# Patient Record
Sex: MALE | Race: ASIAN | NOT HISPANIC OR LATINO
[De-identification: names, ages, dates, MRNs, and addresses within clinical notes are randomized per-mention and may not be internally consistent; named-entity substitution may affect disease eponyms.]

---

## 2019-09-09 ENCOUNTER — APPOINTMENT (OUTPATIENT)
Dept: PEDIATRIC ALLERGY IMMUNOLOGY | Facility: CLINIC | Age: 39
End: 2019-09-09

## 2020-07-01 ENCOUNTER — APPOINTMENT (OUTPATIENT)
Dept: ENDOCRINOLOGY | Facility: CLINIC | Age: 40
End: 2020-07-01
Payer: COMMERCIAL

## 2020-07-01 VITALS
OXYGEN SATURATION: 97 % | BODY MASS INDEX: 21.22 KG/M2 | SYSTOLIC BLOOD PRESSURE: 109 MMHG | RESPIRATION RATE: 15 BRPM | HEIGHT: 68 IN | HEART RATE: 70 BPM | WEIGHT: 140 LBS | TEMPERATURE: 98.1 F | DIASTOLIC BLOOD PRESSURE: 74 MMHG

## 2020-07-01 DIAGNOSIS — Z83.3 FAMILY HISTORY OF DIABETES MELLITUS: ICD-10-CM

## 2020-07-01 DIAGNOSIS — F32.9 MAJOR DEPRESSIVE DISORDER, SINGLE EPISODE, UNSPECIFIED: ICD-10-CM

## 2020-07-01 DIAGNOSIS — Z80.0 FAMILY HISTORY OF MALIGNANT NEOPLASM OF DIGESTIVE ORGANS: ICD-10-CM

## 2020-07-01 DIAGNOSIS — Z78.9 OTHER SPECIFIED HEALTH STATUS: ICD-10-CM

## 2020-07-01 DIAGNOSIS — R53.83 OTHER FATIGUE: ICD-10-CM

## 2020-07-01 DIAGNOSIS — Z86.59 PERSONAL HISTORY OF OTHER MENTAL AND BEHAVIORAL DISORDERS: ICD-10-CM

## 2020-07-01 PROCEDURE — 99203 OFFICE O/P NEW LOW 30 MIN: CPT | Mod: 25

## 2020-07-01 PROCEDURE — 36415 COLL VENOUS BLD VENIPUNCTURE: CPT

## 2020-07-01 RX ORDER — ESCITALOPRAM OXALATE 20 MG/1
20 TABLET ORAL
Refills: 0 | Status: ACTIVE | COMMUNITY
Start: 2020-07-01

## 2020-07-01 RX ORDER — LEVOTHYROXINE SODIUM 0.03 MG/1
25 TABLET ORAL
Refills: 0 | Status: ACTIVE | COMMUNITY
Start: 2020-07-01

## 2020-07-01 NOTE — PHYSICAL EXAM
[Alert] : alert [Well Nourished] : well nourished [Healthy Appearance] : healthy appearance [No Acute Distress] : no acute distress [Normal Sclera/Conjunctiva] : normal sclera/conjunctiva [Normal Voice/Communication] : normal voice communication [Well Developed] : well developed [No Neck Mass] : no neck mass was observed [No Proptosis] : no proptosis [No LAD] : no lymphadenopathy [No Thyroid Nodules] : no palpable thyroid nodules [Thyroid Not Enlarged] : the thyroid was not enlarged [Supple] : the neck was supple [No Accessory Muscle Use] : no accessory muscle use [No Respiratory Distress] : no respiratory distress [Normal Rate and Effort] : normal respiratory rate and effort [Normal Rate] : heart rate was normal [Spine Straight] : spine straight [No Edema] : no peripheral edema [No Stigmata of Cushings Syndrome] : no stigmata of Cushings Syndrome [No Clubbing, Cyanosis] : no clubbing  or cyanosis of the fingernails [Normal Gait] : normal gait [No Involuntary Movements] : no involuntary movements were seen [Acanthosis Nigricans] : no acanthosis nigricans [No Tremors] : no tremors [Normal Affect] : the affect was normal [Normal Insight/Judgement] : insight and judgment were intact [Normal Mood] : the mood was normal

## 2020-07-01 NOTE — ASSESSMENT
[FreeTextEntry1] : This is a 40-year-old male with primary hypothyroidism, here for evaluation of fatigue.\par Checked TFTs as he decreased levothyroxine on his own to 25 mcg two weeks ago.\par Check morning fasting testosterone.\par Check cortisol.\par Check thyroid sonogram to evaluate for thyroid nodules.\par Further management pending above results.

## 2020-07-01 NOTE — HISTORY OF PRESENT ILLNESS
[FreeTextEntry1] : CC: Exhausted\par This is a 40-year-old male with primary hypothyroidism, here for evaluation of extreme fatigue.\par He was diagnosed with hypothyroidism at the age of 35. He reports extreme fatigue, exhaustion, and decreased motivation.\par He currently takes levothyroxine 25 mcg daily. He decreased this on his own 2 weeks ago from 50 mcg daily as he did not believe 50 mcg was helping his symptoms.\par He was started on Lexapro approximately 3 years ago.

## 2020-07-02 LAB
CORTIS SERPL-MCNC: 11.1 UG/DL
T4 FREE SERPL-MCNC: 1.1 NG/DL
THYROGLOB AB SERPL-ACNC: <20 IU/ML
THYROPEROXIDASE AB SERPL IA-ACNC: 20.4 IU/ML
TSH SERPL-ACNC: 3.54 UIU/ML

## 2020-07-09 LAB
TESTOST BND SERPL-MCNC: 8.2 PG/ML
TESTOST SERPL-MCNC: 423.9 NG/DL

## 2020-07-10 ENCOUNTER — TRANSCRIPTION ENCOUNTER (OUTPATIENT)
Age: 40
End: 2020-07-10

## 2020-10-29 ENCOUNTER — APPOINTMENT (OUTPATIENT)
Dept: ENDOCRINOLOGY | Facility: CLINIC | Age: 40
End: 2020-10-29

## 2021-01-06 ENCOUNTER — APPOINTMENT (OUTPATIENT)
Dept: ENDOCRINOLOGY | Facility: CLINIC | Age: 41
End: 2021-01-06
Payer: COMMERCIAL

## 2021-01-06 VITALS
OXYGEN SATURATION: 98 % | HEART RATE: 66 BPM | WEIGHT: 142 LBS | TEMPERATURE: 98 F | DIASTOLIC BLOOD PRESSURE: 73 MMHG | HEIGHT: 69.29 IN | BODY MASS INDEX: 20.79 KG/M2 | SYSTOLIC BLOOD PRESSURE: 111 MMHG

## 2021-01-06 DIAGNOSIS — E04.1 NONTOXIC SINGLE THYROID NODULE: ICD-10-CM

## 2021-01-06 DIAGNOSIS — E03.9 HYPOTHYROIDISM, UNSPECIFIED: ICD-10-CM

## 2021-01-06 PROCEDURE — 36415 COLL VENOUS BLD VENIPUNCTURE: CPT

## 2021-01-06 PROCEDURE — 99072 ADDL SUPL MATRL&STAF TM PHE: CPT

## 2021-01-06 PROCEDURE — 99214 OFFICE O/P EST MOD 30 MIN: CPT | Mod: 25

## 2021-01-06 NOTE — ASSESSMENT
[FreeTextEntry1] : This is a 40-year-old male with primary hypothyroidism, thyroid nodule, here for follow-up.\par Thyroid antibodies are negative.\par Check TFTs.  Continue levothyroxine 25 mcg daily pending results.\par Testosterone and cortisol are normal.  Advised to follow-up with PCP regarding fatigue.\par Check thyroid sonogram in July 2021 to follow stability of thyroid nodule.

## 2021-01-06 NOTE — HISTORY OF PRESENT ILLNESS
[FreeTextEntry1] : CC: Thyroid check\par This is a 40-year-old male with primary hypothyroidism, thyroid nodule, here for follow-up.\par He was diagnosed with hypothyroidism at the age of 35.  Thyroid antibodies are negative.  He continues to report extreme fatigue and difficulty keeping up with his 2 sons.  He naps in the middle of the day.  He also reports headache and itchy skin.\par He currently takes levothyroxine 25 mcg daily. \par Lexapro has been decreased to 5 mg daily.

## 2021-01-06 NOTE — REVIEW OF SYSTEMS
[Fatigue] : fatigue [Headaches] : headaches [All other systems negative] : All other systems negative [de-identified] : Itchy skin

## 2021-01-08 ENCOUNTER — NON-APPOINTMENT (OUTPATIENT)
Age: 41
End: 2021-01-08

## 2021-01-08 DIAGNOSIS — E55.9 VITAMIN D DEFICIENCY, UNSPECIFIED: ICD-10-CM

## 2021-01-08 LAB
25(OH)D3 SERPL-MCNC: 28.8 NG/ML
ALBUMIN SERPL ELPH-MCNC: 5.2 G/DL
ALP BLD-CCNC: 61 U/L
ALT SERPL-CCNC: 14 U/L
ANION GAP SERPL CALC-SCNC: 12 MMOL/L
AST SERPL-CCNC: 23 U/L
BASOPHILS # BLD AUTO: 0.07 K/UL
BASOPHILS NFR BLD AUTO: 1 %
BILIRUB SERPL-MCNC: 0.2 MG/DL
BUN SERPL-MCNC: 27 MG/DL
CALCIUM SERPL-MCNC: 10 MG/DL
CHLORIDE SERPL-SCNC: 103 MMOL/L
CO2 SERPL-SCNC: 24 MMOL/L
CREAT SERPL-MCNC: 0.91 MG/DL
EOSINOPHIL # BLD AUTO: 0.45 K/UL
EOSINOPHIL NFR BLD AUTO: 6.5 %
GLUCOSE SERPL-MCNC: 99 MG/DL
HCT VFR BLD CALC: 41.5 %
HGB BLD-MCNC: 13.8 G/DL
IMM GRANULOCYTES NFR BLD AUTO: 0.1 %
LYMPHOCYTES # BLD AUTO: 1.81 K/UL
LYMPHOCYTES NFR BLD AUTO: 26.1 %
MAN DIFF?: NORMAL
MCHC RBC-ENTMCNC: 29.4 PG
MCHC RBC-ENTMCNC: 33.3 GM/DL
MCV RBC AUTO: 88.3 FL
MONOCYTES # BLD AUTO: 0.37 K/UL
MONOCYTES NFR BLD AUTO: 5.3 %
NEUTROPHILS # BLD AUTO: 4.22 K/UL
NEUTROPHILS NFR BLD AUTO: 61 %
PLATELET # BLD AUTO: 227 K/UL
POTASSIUM SERPL-SCNC: 4.5 MMOL/L
PROT SERPL-MCNC: 7.3 G/DL
RBC # BLD: 4.7 M/UL
RBC # FLD: 12.4 %
SODIUM SERPL-SCNC: 139 MMOL/L
T4 FREE SERPL-MCNC: 1.3 NG/DL
TSH SERPL-ACNC: 2.97 UIU/ML
WBC # FLD AUTO: 6.93 K/UL

## 2022-07-22 ENCOUNTER — EMERGENCY (EMERGENCY)
Facility: HOSPITAL | Age: 42
LOS: 1 days | Discharge: HOME | End: 2022-07-22
Attending: EMERGENCY MEDICINE
Payer: COMMERCIAL

## 2022-07-22 VITALS
SYSTOLIC BLOOD PRESSURE: 130 MMHG | DIASTOLIC BLOOD PRESSURE: 72 MMHG | HEART RATE: 67 BPM | OXYGEN SATURATION: 100 % | RESPIRATION RATE: 20 BRPM | TEMPERATURE: 98 F

## 2022-07-22 VITALS
RESPIRATION RATE: 20 BRPM | HEART RATE: 70 BPM | DIASTOLIC BLOOD PRESSURE: 90 MMHG | OXYGEN SATURATION: 100 % | SYSTOLIC BLOOD PRESSURE: 134 MMHG | HEIGHT: 68 IN | WEIGHT: 145.06 LBS | TEMPERATURE: 98 F

## 2022-07-22 DIAGNOSIS — F43.23 ADJUSTMENT DISORDER WITH MIXED ANXIETY AND DEPRESSED MOOD: ICD-10-CM

## 2022-07-22 LAB
ALBUMIN SERPL ELPH-MCNC: 5.1 G/DL — HIGH (ref 3.3–5)
ALP SERPL-CCNC: 76 U/L — SIGNIFICANT CHANGE UP (ref 40–120)
ALT FLD-CCNC: 21 U/L — SIGNIFICANT CHANGE UP (ref 10–45)
AMPHET UR-MCNC: NEGATIVE — SIGNIFICANT CHANGE UP
ANION GAP SERPL CALC-SCNC: 14 MMOL/L — SIGNIFICANT CHANGE UP (ref 5–17)
APAP SERPL-MCNC: <15 UG/ML — SIGNIFICANT CHANGE UP (ref 10–30)
APPEARANCE UR: CLEAR — SIGNIFICANT CHANGE UP
AST SERPL-CCNC: 28 U/L — SIGNIFICANT CHANGE UP (ref 10–40)
BARBITURATES UR SCN-MCNC: NEGATIVE — SIGNIFICANT CHANGE UP
BASOPHILS # BLD AUTO: 0.02 K/UL — SIGNIFICANT CHANGE UP (ref 0–0.2)
BASOPHILS NFR BLD AUTO: 0.2 % — SIGNIFICANT CHANGE UP (ref 0–2)
BENZODIAZ UR-MCNC: NEGATIVE — SIGNIFICANT CHANGE UP
BILIRUB SERPL-MCNC: 0.6 MG/DL — SIGNIFICANT CHANGE UP (ref 0.2–1.2)
BILIRUB UR-MCNC: NEGATIVE — SIGNIFICANT CHANGE UP
BUN SERPL-MCNC: 14 MG/DL — SIGNIFICANT CHANGE UP (ref 7–23)
CALCIUM SERPL-MCNC: 9.7 MG/DL — SIGNIFICANT CHANGE UP (ref 8.4–10.5)
CHLORIDE SERPL-SCNC: 99 MMOL/L — SIGNIFICANT CHANGE UP (ref 96–108)
CO2 SERPL-SCNC: 24 MMOL/L — SIGNIFICANT CHANGE UP (ref 22–31)
COCAINE METAB.OTHER UR-MCNC: NEGATIVE — SIGNIFICANT CHANGE UP
COLOR SPEC: SIGNIFICANT CHANGE UP
CREAT SERPL-MCNC: 0.96 MG/DL — SIGNIFICANT CHANGE UP (ref 0.5–1.3)
DIFF PNL FLD: NEGATIVE — SIGNIFICANT CHANGE UP
EGFR: 101 ML/MIN/1.73M2 — SIGNIFICANT CHANGE UP
EOSINOPHIL # BLD AUTO: 0.07 K/UL — SIGNIFICANT CHANGE UP (ref 0–0.5)
EOSINOPHIL NFR BLD AUTO: 0.7 % — SIGNIFICANT CHANGE UP (ref 0–6)
ETHANOL SERPL-MCNC: <10 MG/DL — SIGNIFICANT CHANGE UP (ref 0–10)
GLUCOSE SERPL-MCNC: 117 MG/DL — HIGH (ref 70–99)
GLUCOSE UR QL: NEGATIVE — SIGNIFICANT CHANGE UP
HCT VFR BLD CALC: 40.3 % — SIGNIFICANT CHANGE UP (ref 39–50)
HGB BLD-MCNC: 13.6 G/DL — SIGNIFICANT CHANGE UP (ref 13–17)
IMM GRANULOCYTES NFR BLD AUTO: 0.2 % — SIGNIFICANT CHANGE UP (ref 0–1.5)
KETONES UR-MCNC: SIGNIFICANT CHANGE UP
LEUKOCYTE ESTERASE UR-ACNC: NEGATIVE — SIGNIFICANT CHANGE UP
LYMPHOCYTES # BLD AUTO: 1.67 K/UL — SIGNIFICANT CHANGE UP (ref 1–3.3)
LYMPHOCYTES # BLD AUTO: 17.5 % — SIGNIFICANT CHANGE UP (ref 13–44)
MCHC RBC-ENTMCNC: 28.7 PG — SIGNIFICANT CHANGE UP (ref 27–34)
MCHC RBC-ENTMCNC: 33.7 GM/DL — SIGNIFICANT CHANGE UP (ref 32–36)
MCV RBC AUTO: 85 FL — SIGNIFICANT CHANGE UP (ref 80–100)
METHADONE UR-MCNC: NEGATIVE — SIGNIFICANT CHANGE UP
MONOCYTES # BLD AUTO: 0.34 K/UL — SIGNIFICANT CHANGE UP (ref 0–0.9)
MONOCYTES NFR BLD AUTO: 3.6 % — SIGNIFICANT CHANGE UP (ref 2–14)
NEUTROPHILS # BLD AUTO: 7.41 K/UL — HIGH (ref 1.8–7.4)
NEUTROPHILS NFR BLD AUTO: 77.8 % — HIGH (ref 43–77)
NITRITE UR-MCNC: NEGATIVE — SIGNIFICANT CHANGE UP
NRBC # BLD: 0 /100 WBCS — SIGNIFICANT CHANGE UP (ref 0–0)
OPIATES UR-MCNC: NEGATIVE — SIGNIFICANT CHANGE UP
OXYCODONE UR-MCNC: NEGATIVE — SIGNIFICANT CHANGE UP
PCP SPEC-MCNC: SIGNIFICANT CHANGE UP
PCP UR-MCNC: NEGATIVE — SIGNIFICANT CHANGE UP
PH UR: 5.5 — SIGNIFICANT CHANGE UP (ref 5–8)
PLATELET # BLD AUTO: 204 K/UL — SIGNIFICANT CHANGE UP (ref 150–400)
POTASSIUM SERPL-MCNC: 4 MMOL/L — SIGNIFICANT CHANGE UP (ref 3.5–5.3)
POTASSIUM SERPL-SCNC: 4 MMOL/L — SIGNIFICANT CHANGE UP (ref 3.5–5.3)
PROT SERPL-MCNC: 8.4 G/DL — HIGH (ref 6–8.3)
PROT UR-MCNC: NEGATIVE — SIGNIFICANT CHANGE UP
RBC # BLD: 4.74 M/UL — SIGNIFICANT CHANGE UP (ref 4.2–5.8)
RBC # FLD: 12.2 % — SIGNIFICANT CHANGE UP (ref 10.3–14.5)
SALICYLATES SERPL-MCNC: <2 MG/DL — LOW (ref 15–30)
SARS-COV-2 RNA SPEC QL NAA+PROBE: SIGNIFICANT CHANGE UP
SODIUM SERPL-SCNC: 137 MMOL/L — SIGNIFICANT CHANGE UP (ref 135–145)
SP GR SPEC: 1.01 — SIGNIFICANT CHANGE UP (ref 1.01–1.02)
THC UR QL: NEGATIVE — SIGNIFICANT CHANGE UP
TSH SERPL-MCNC: 3.24 UIU/ML — SIGNIFICANT CHANGE UP (ref 0.27–4.2)
UROBILINOGEN FLD QL: NEGATIVE — SIGNIFICANT CHANGE UP
WBC # BLD: 9.53 K/UL — SIGNIFICANT CHANGE UP (ref 3.8–10.5)
WBC # FLD AUTO: 9.53 K/UL — SIGNIFICANT CHANGE UP (ref 3.8–10.5)

## 2022-07-22 PROCEDURE — 81003 URINALYSIS AUTO W/O SCOPE: CPT

## 2022-07-22 PROCEDURE — U0003: CPT

## 2022-07-22 PROCEDURE — U0005: CPT

## 2022-07-22 PROCEDURE — 80307 DRUG TEST PRSMV CHEM ANLYZR: CPT

## 2022-07-22 PROCEDURE — 90792 PSYCH DIAG EVAL W/MED SRVCS: CPT | Mod: GC

## 2022-07-22 PROCEDURE — 99285 EMERGENCY DEPT VISIT HI MDM: CPT

## 2022-07-22 PROCEDURE — 85025 COMPLETE CBC W/AUTO DIFF WBC: CPT

## 2022-07-22 PROCEDURE — 36415 COLL VENOUS BLD VENIPUNCTURE: CPT

## 2022-07-22 PROCEDURE — 84443 ASSAY THYROID STIM HORMONE: CPT

## 2022-07-22 PROCEDURE — 80053 COMPREHEN METABOLIC PANEL: CPT

## 2022-07-22 RX ORDER — ESCITALOPRAM OXALATE 10 MG/1
1 TABLET, FILM COATED ORAL
Qty: 7 | Refills: 0
Start: 2022-07-22 | End: 2022-07-28

## 2022-07-22 RX ADMIN — Medication 0.5 MILLIGRAM(S): at 14:38

## 2022-07-22 NOTE — ED BEHAVIORAL HEALTH ASSESSMENT NOTE - OTHER PAST PSYCHIATRIC HISTORY (INCLUDE DETAILS REGARDING ONSET, COURSE OF ILLNESS, INPATIENT/OUTPATIENT TREATMENT)
see HPI: hx treatment of depression with Lexapro per neurologist, good effect, self-discontinued one year ago. Trial of one other unknown antidepressant. No psychiatric hospitalizations, suicide attempts, self-injurious behavior, treatment per outpatient psychiatry.

## 2022-07-22 NOTE — ED ADULT NURSE NOTE - DATE OF LAST VACCINATION
Do not drink alcohol, drive or operate heavy machinery while taking Robaxin.  Do not take OTC NSAIDs while you are on Toradol.  Do not take Toradol within 6 hr of discharge from the ER.  Take all medications as prescribed.  Follow-up with your primary care provider as discussed.  Please remember that you had a visit to the emergency room today and this does not substitute as primary care services for ongoing management because emergency services is a snap shot in time.  Should you have any worsening condition that requires emergency services do not hesitate to return to the ER.    COVID-19 TESTING  IF YOU DESIRE TO BE TESTED, CALL TO SCHEDULE AN APPOINTMENT:  Hot Line [phone]  [address] [place] [address], [place] [address]  [facility]  Hours 8am-5pm Monday Through Friday      
22-Oct-2021

## 2022-07-22 NOTE — ED BEHAVIORAL HEALTH ASSESSMENT NOTE - NSBHATTESTCOMMENTATTENDFT_PSY_A_CORE
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  Pt states he was doing well on Lexapro but came off of it 1 year ago; in the past year has noticed progressively increasing fatigue, now finds difficulty even to sit up.  States this morning he felt so frustrated by his condition he "wanted it all to stop" for about 1 minute, and came to ED for c/o passive SI.  States SI completely resolved; he would never harm himself 2/2 love of his children and family.  States he has been feeling anxious, considering restarting Lexapro, also pending a f/u appointment with his neurologist to continue his workup on Monday.  Denies AVH, paranoia, substance abuse, or access to guns.  Pt does not wish to be admitted to psychiatry, does not meet criteria for involuntary commitment.  Wife at bedside, denies acute safety concerns.  Pt and wife aware to call 911 or return to ER if imminent risk of harm to self/others.  Dx: adjustment disorder.  Recs: Can restart Lexapro 5mg PO daily,  for OP psych referral resources and info for [facility] walk-in clinic.  Agree with fellow's assessment and plan as above.

## 2022-07-22 NOTE — ED PROVIDER NOTE - OBJECTIVE STATEMENT
42-year-old man with no reported past medical history presents to the ED with suicidal ideation this morning. Patient over the last month have been experiencing episodes of decreased energy. He also endorses difficulty concentrating as well. He states that he is more irritable and finds less than enjoyment in his work . Patient works as a Speech Pathologist. He denies any chest pain, shortness of breath, abdominal pain, fevers, chills, nausea, vomiting, diarrhea. As per wife there are stressors at home. He has been eating appropriately, no significant weight gain or loss.

## 2022-07-22 NOTE — ED BEHAVIORAL HEALTH ASSESSMENT NOTE - RISK ASSESSMENT
Low Acute Suicide Risk Patient is at low acute and low chronic risk of harm. Acute risk factors include anxiety, depressed mood, and distress from somatic symptoms. Chronic risk factors include prior diagnosis of depression. No history of psychiatric hospitalization or suicide attempt. Significant protective factors, including sense of responsibility to children/wife, supportive relationships, treatment/help-seeking behavior, future-oriented thinking, sobriety, residential stability, and employment stability. Wife denies acute safety concerns. Patient declines voluntary inpatient hospitalization; as he is not at imminent risk of harm, he does not meet criteria for involuntary psychiatric hospitalization. He is safe and appropriate for follow-up with psychiatric care as an outpatient.

## 2022-07-22 NOTE — ED PROVIDER NOTE - PATIENT PORTAL LINK FT
You can access the FollowMyHealth Patient Portal offered by [facility] by registering at the following website: [email or web address]. By joining [email or web address]’s FollowMyHealth portal, you will also be able to view your health information using other applications (apps) compatible with our system.

## 2022-07-22 NOTE — ED BEHAVIORAL HEALTH ASSESSMENT NOTE - SUMMARY
42M with PMHx head injury w/ LOC 10 yrs ago and chronic fatigue of unknown etiology, PPHx depression treated per Neuro with antidepressants, not currently on psychiatric medications, never in formal outpatient psychiatric treatment, without history of suicidality/suicide attempt, who present to the [facility] ED accompanied by his wife for episode of dysphoria with intense fatigue and passive suicidal ideation.    Patient cooperative/linear/oriented and appearing distressed. States he had a brief "breakdown" this morning and denies currently experienced suicidal ideation/intent/plan as well as history of suicidal ideation/intent/plan. Names children as major protective factors. Reports long history of fatigue of unknown etiology now with concomitant anhedonia, difficulty concentrating, difficulty functioning in his normal fashion, and depressed/irritable mood. Declines voluntary psychiatric admission and amenable to outpatient psychiatric follow-up/medications as he undergoes work-up per Neurology and Rheumatology for chronic fatigue. Denies substance use, suicide attempts, and kate/psychosis. No acute safety concerns per wife. 42M with PMHx head injury w/ LOC 10 yrs ago and chronic fatigue of unknown etiology, PPHx depression treated per Neuro with antidepressants, not currently on psychiatric medications, never in formal outpatient psychiatric treatment, without history of suicidality/suicide attempt, who present to the [facility] ED accompanied by his wife for episode of dysphoria with intense fatigue and passive suicidal ideation.    Patient cooperative/linear/oriented and appearing distressed. States he had a brief "breakdown" this morning and denies currently experienced suicidal ideation/intent/plan as well as history of suicidal ideation/intent/plan. Names children as major protective factors. Reports long history of fatigue of unknown etiology now with concomitant anhedonia, difficulty concentrating, difficulty functioning in his normal fashion, and depressed/irritable mood. Declines voluntary psychiatric admission and amenable to outpatient psychiatric follow-up/medications as he undergoes work-up per Neurology and Rheumatology for chronic fatigue. Denies substance use, suicide attempts, and kate/psychosis. No acute safety concerns per wife. Psychiatrically cleared for discharge to outpatient level of psychiatric care. SW engaged for further referrals/support. 42M with PMHx head injury w/ LOC 10 yrs ago and chronic fatigue of unknown etiology, PPHx depression treated per Neuro with antidepressants, not currently on psychiatric medications, never in formal outpatient psychiatric treatment, without history of suicidality/suicide attempt, who present to the [facility] ED accompanied by his wife for episode of dysphoria with intense fatigue and passive suicidal ideation.    Patient cooperative/linear/oriented and appearing distressed. States he had a brief "breakdown" this morning and denies currently experienced suicidal ideation/intent/plan as well as history of suicidal ideation/intent/plan. Names children as major protective factors. Reports long history of fatigue of unknown etiology now with concomitant anhedonia, difficulty concentrating, difficulty functioning in his normal fashion, and depressed/irritable mood. Declines voluntary psychiatric admission and amenable to outpatient psychiatric follow-up/medications as he undergoes work-up per Neurology and Rheumatology for chronic fatigue. Denies substance use, suicide attempts, and kate/psychosis. No acute safety concerns per wife. Psychiatrically cleared for discharge to outpatient level of psychiatric care. SW engaged for further referrals/support.     RECOMMENDATIONS:  1) Provide 7 day supply of Lexapro 10mg PO qD as bridge to outpatient psychiatric care; patient may cut pills in half for 5mg qD for poor tolerability. Discussed risks/benefits to tx with Lexapro; pt expresses understanding.  2) Pt to follow-up with [facility] to establish outpatient psychiatric care. 42M with PMHx head injury w/ LOC 10 yrs ago and chronic fatigue of unknown etiology, PPHx depression treated per Neuro with antidepressants, not currently on psychiatric medications, never in formal outpatient psychiatric treatment, without history of suicidality/suicide attempt, who present to the [facility] ED accompanied by his wife for episode of dysphoria with intense fatigue and passive suicidal ideation.    Patient cooperative/linear/oriented and appearing distressed. States he had a brief "breakdown" this morning and denies currently experienced suicidal ideation/intent/plan as well as history of suicidal ideation/intent/plan. Names children as major protective factors. Reports long history of fatigue of unknown etiology now with concomitant anhedonia, difficulty concentrating, difficulty functioning in his normal fashion, and depressed/irritable mood. Declines voluntary psychiatric admission and amenable to outpatient psychiatric follow-up/medications as he undergoes work-up per Neurology and Rheumatology for chronic fatigue. Denies substance use, suicide attempts, and kate/psychosis. No acute safety concerns per wife. Psychiatrically cleared for discharge to outpatient level of psychiatric care. SW engaged for further referrals/support.     RECOMMENDATIONS:  1) Provide 7 day supply of Lexapro 10mg PO qD as bridge to outpatient psychiatric care; patient may cut pills in half for 5mg qD if poor tolerability. Discussed risks/benefits to tx with Lexapro; pt expresses understanding.  2) Pt to follow-up with [facility] to establish outpatient psychiatric care.

## 2022-07-22 NOTE — ED BEHAVIORAL HEALTH ASSESSMENT NOTE - DETAILS
2 children see HPI: brief episode of passive suicidal ideation this morning in the context of dysphoria/exhaustion reporting congestion referent is patient Advised to return to hospital, go to nearest ED, or call 911 for any severe or worsening symptoms, particularly suicidal and/or homicidal ideations, intent, or plans. Patient verbalized understanding of instructions.

## 2022-07-22 NOTE — ED BEHAVIORAL HEALTH ASSESSMENT NOTE - DESCRIPTION
Calm and cooperative in the ED. 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    T(C): 36.8 (07-22-22 @ 15:33), Max: 36.8 (07-22-22 @ 15:33)  HR: 67 (07-22-22 @ 15:33) (67 - 70)  BP: 130/72 (07-22-22 @ 15:33) (130/72 - 134/90)  RR: 20 (07-22-22 @ 15:33) (20 - 20)  SpO2: 100% (07-22-22 @ 15:33) (100% - 100%)  Wt(kg): --

## 2022-07-22 NOTE — ED PROVIDER NOTE - CLINICAL SUMMARY MEDICAL DECISION MAKING FREE TEXT BOX
ATTG: : Suicidal ideation denies plan, no access to firearms, wife here for collateral and support, check labs, psych eval, re shawn for dispo.

## 2022-07-22 NOTE — ED BEHAVIORAL HEALTH ASSESSMENT NOTE - REFERRAL / APPOINTMENT DETAILS
Provided with information for [facility] ([phone]) to present on a walk-in basis from M to F, 9a, to 3p. SW engaged for further referrals/support.

## 2022-07-22 NOTE — ED PROVIDER NOTE - NSFOLLOWUPINSTRUCTIONS_ED_ALL_ED_FT
Your diagnosis this visit was: Depression    From this ED visit you were prescribed: Lexapro take as directed.    You may be contacted by our Emergency Department Referrals Coordinator to set up your follow-up appointment within 24-48 hours of your discharge, Monday through Friday.   We recommend you follow up with:  [facility] ([phone]) to present on a walk-in basis from M to F, 9a, to 3p.    Please return to the Emergency Department if you experience any of the following symptoms:  - Worsening depression, feeling suicidal, homicidal, thoughts of harm  - Shortness of breath or trouble breathing  - Pressure, pain, or tightness in the chest  - Face drooping, arm weakness or speech difficulty,  - Persistent or severe vomiting  - Head injury or loss of consciousness  - Nonstop bleeding or an open wound

## 2022-07-22 NOTE — ED PROVIDER NOTE - ATTENDING CONTRIBUTION TO CARE
43 y/o m with pmhx denies presents for suicidal ideation. patient was feeling tired and with less energy than usual. He was also having congestion and decided to take  Robitussin. He shortly after felt sudden onset of suicidal ideation without definite plan. He called EMS and was brought to the hospital. he never experienced in the past. denies any alcohol or drugs. no nausea no vomiting but diminished po intake. no fever no chills. he has a regular md and is currently getting worked up for low energy - neuro and ra.  Gen. no acute distress, well appearing male  HEENT:  perrl eomi pharynx clear  Lungs:  b/l bs  CVS: S1S2   Abd;  soft no tender no distention  Ext: no pitting edema no erythema  Neuro: aaox3 no focal deficits  MSK: strength 5/5 b/l upper and lower ext.

## 2022-07-22 NOTE — CHART NOTE - NSCHARTNOTEFT_GEN_A_CORE
EMERGENCY : SW consulted by psychiatry as patient assessed and psychiatrically cleared for discharge at this time, in need of mental health outpatient resources. LMSW reviewed patient's chart. As per chart review patient is a "42-year-old man with no reported past medical history presents to the ED with suicidal ideation this morning. Patient over the last month have been experiencing episodes of decreased energy. He also endorses difficulty concentrating as well. He states that he is more irritable and finds less than enjoyment in his work . Patient works as a Speech Pathologist. He denies any chest pain, shortness of breath, abdominal pain, fevers, chills, nausea, vomiting, diarrhea. As per wife there are stressors at home. He has been eating appropriately, no significant weight gain or loss."    LMSW met with patient at bedside and introduced self and role to which he verbalized understanding. Patient is A&Ox4 at this time. Patient's wife present at bedside. Patient consents to wife present during social work consultation. Patient identifies his wife as his emergency contact Julia French PH: [phone]. Patient confirms demographic information reflective in chart including [facility] insurance benefits. LMSW provided education on outpatient mental health resources to which patient was receptive. LMSW provided patient with [facility] and additional community resources to which patient was receptive. Patient and wife endorse no further social work needs at present, patient's wife to transport patient home. Contact information and ongoing social work availability insured. Social work continues to remain available for any further needs.

## 2022-07-22 NOTE — ED BEHAVIORAL HEALTH ASSESSMENT NOTE - HPI (INCLUDE ILLNESS QUALITY, SEVERITY, DURATION, TIMING, CONTEXT, MODIFYING FACTORS, ASSOCIATED SIGNS AND SYMPTOMS)
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 Chart reviewed. [facility] psych consulted for evaluation.    Patient seen and examined. Cooperative, intermittently appearing distressed/breathless. States he was disturbed by the passive suicidal thoughts he had this morning in the context of dysphoria/severe fatigue, calling the episode a "breakdown." States he has no passive/active suicidal ideation/intent/plan, naming his children as his most significant protective factor. Expresses distress at his prolonged symptoms and states he wishes to get better; states he is coming to accept that depression may be a contributor to his current symptoms. Describes history of intermittent severe fatigue over the past seven years; currently experiencing one year of persistent fatigue with difficulty concentrating, preoccupying anxiety about the fatigue, difficulty sustaining normal level of functioning, and anhedonia. Has more acutely developed intermittently sad and irritable mood. Denies significant insomnia. No psychosis/kate. Patient visibly uncomfortable throughout much of interview; accepts lorazepam 0.5mg PO PRN acute anxiety. Expresses commitment to outpatient follow-up for medication management/counseling.    Patient reports that while he has never been in formal psychiatric treatment, his neurologist has treated him for depression in the past with Lexapro, which worked well. He self-discontinued this one year ago as he was concerned about long-term side effects. He also had a previous trial of low-dose synthroid for his fatigue which he discontinued one year ago. Believes he had a brief trial of one other antidepressant but is not sure what it was called. Denies psychiatric hospitalizations, suicide attempts, history of aggression, legal history, and access to firearm. Denies history of abuse/trauma beyond head injury 10 yrs ago (occurred during sports game).    Patient denies tobacco smoking, alcohol use, and marijuana use. Denies illicit drug use.    Wife interviewed separately from patient: denies any acute safety concerns.

## 2022-07-22 NOTE — ED ADULT TRIAGE NOTE - DATE OF LAST VACCINATION
----- Message from Luz Elena Ballard sent at 6/16/2020  3:16 PM EDT -----  Regarding: Prescription Question  Contact: [phone]  I went to [facility] 2 to 3 days ago. I had a boil that got infected. They ran 2 antibiotics thru IV. And called in 2 antibiotics 2. I have developed a yeast infection because of it. I am requesting a prescription be called into [facility]. Hernán Deluca. [phone]  
22-Oct-2021

## 2022-07-22 NOTE — ED ADULT NURSE NOTE - OBJECTIVE STATEMENT
42 year old male BIB wife with SI; A&O; denies any active SI intent or plan at this time; denies AVH; denies ETOH and drug use; Axis III: recent symptoms of malaise without any etiology or DX despite multiple medical work ups. This is an anxious but very pleasant and cooperative pt, wanding done, CO initiated. 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

## 2022-07-25 ENCOUNTER — EMERGENCY (EMERGENCY)
Facility: HOSPITAL | Age: 42
LOS: 1 days | Discharge: HOME | End: 2022-07-25
Attending: EMERGENCY MEDICINE
Payer: COMMERCIAL

## 2022-07-25 VITALS
OXYGEN SATURATION: 100 % | SYSTOLIC BLOOD PRESSURE: 137 MMHG | HEIGHT: 68 IN | RESPIRATION RATE: 20 BRPM | TEMPERATURE: 98 F | HEART RATE: 79 BPM | DIASTOLIC BLOOD PRESSURE: 92 MMHG | WEIGHT: 139.99 LBS

## 2022-07-25 LAB
ALBUMIN SERPL ELPH-MCNC: 4.4 G/DL — SIGNIFICANT CHANGE UP (ref 3.3–5)
ALP SERPL-CCNC: 72 U/L — SIGNIFICANT CHANGE UP (ref 40–120)
ALT FLD-CCNC: 16 U/L — SIGNIFICANT CHANGE UP (ref 10–45)
ANION GAP SERPL CALC-SCNC: 14 MMOL/L — SIGNIFICANT CHANGE UP (ref 5–17)
APAP SERPL-MCNC: <15 UG/ML — SIGNIFICANT CHANGE UP (ref 10–30)
AST SERPL-CCNC: 21 U/L — SIGNIFICANT CHANGE UP (ref 10–40)
BASOPHILS # BLD AUTO: 0.04 K/UL — SIGNIFICANT CHANGE UP (ref 0–0.2)
BASOPHILS NFR BLD AUTO: 0.5 % — SIGNIFICANT CHANGE UP (ref 0–2)
BILIRUB SERPL-MCNC: 0.3 MG/DL — SIGNIFICANT CHANGE UP (ref 0.2–1.2)
BUN SERPL-MCNC: 13 MG/DL — SIGNIFICANT CHANGE UP (ref 7–23)
CALCIUM SERPL-MCNC: 9.5 MG/DL — SIGNIFICANT CHANGE UP (ref 8.4–10.5)
CHLORIDE SERPL-SCNC: 100 MMOL/L — SIGNIFICANT CHANGE UP (ref 96–108)
CO2 SERPL-SCNC: 22 MMOL/L — SIGNIFICANT CHANGE UP (ref 22–31)
CREAT SERPL-MCNC: 0.8 MG/DL — SIGNIFICANT CHANGE UP (ref 0.5–1.3)
EGFR: 113 ML/MIN/1.73M2 — SIGNIFICANT CHANGE UP
EOSINOPHIL # BLD AUTO: 0.02 K/UL — SIGNIFICANT CHANGE UP (ref 0–0.5)
EOSINOPHIL NFR BLD AUTO: 0.2 % — SIGNIFICANT CHANGE UP (ref 0–6)
ETHANOL SERPL-MCNC: <10 MG/DL — SIGNIFICANT CHANGE UP (ref 0–10)
GLUCOSE SERPL-MCNC: 129 MG/DL — HIGH (ref 70–99)
HCT VFR BLD CALC: 36.6 % — LOW (ref 39–50)
HGB BLD-MCNC: 12.7 G/DL — LOW (ref 13–17)
IMM GRANULOCYTES NFR BLD AUTO: 0.5 % — SIGNIFICANT CHANGE UP (ref 0–1.5)
LYMPHOCYTES # BLD AUTO: 1.4 K/UL — SIGNIFICANT CHANGE UP (ref 1–3.3)
LYMPHOCYTES # BLD AUTO: 17.2 % — SIGNIFICANT CHANGE UP (ref 13–44)
MCHC RBC-ENTMCNC: 28.5 PG — SIGNIFICANT CHANGE UP (ref 27–34)
MCHC RBC-ENTMCNC: 34.7 GM/DL — SIGNIFICANT CHANGE UP (ref 32–36)
MCV RBC AUTO: 82.2 FL — SIGNIFICANT CHANGE UP (ref 80–100)
MONOCYTES # BLD AUTO: 0.38 K/UL — SIGNIFICANT CHANGE UP (ref 0–0.9)
MONOCYTES NFR BLD AUTO: 4.7 % — SIGNIFICANT CHANGE UP (ref 2–14)
NEUTROPHILS # BLD AUTO: 6.26 K/UL — SIGNIFICANT CHANGE UP (ref 1.8–7.4)
NEUTROPHILS NFR BLD AUTO: 76.9 % — SIGNIFICANT CHANGE UP (ref 43–77)
NRBC # BLD: 0 /100 WBCS — SIGNIFICANT CHANGE UP (ref 0–0)
PLATELET # BLD AUTO: 223 K/UL — SIGNIFICANT CHANGE UP (ref 150–400)
POTASSIUM SERPL-MCNC: 3.8 MMOL/L — SIGNIFICANT CHANGE UP (ref 3.5–5.3)
POTASSIUM SERPL-SCNC: 3.8 MMOL/L — SIGNIFICANT CHANGE UP (ref 3.5–5.3)
PROT SERPL-MCNC: 7.6 G/DL — SIGNIFICANT CHANGE UP (ref 6–8.3)
RBC # BLD: 4.45 M/UL — SIGNIFICANT CHANGE UP (ref 4.2–5.8)
RBC # FLD: 11.8 % — SIGNIFICANT CHANGE UP (ref 10.3–14.5)
SALICYLATES SERPL-MCNC: <2 MG/DL — LOW (ref 15–30)
SODIUM SERPL-SCNC: 136 MMOL/L — SIGNIFICANT CHANGE UP (ref 135–145)
WBC # BLD: 8.14 K/UL — SIGNIFICANT CHANGE UP (ref 3.8–10.5)
WBC # FLD AUTO: 8.14 K/UL — SIGNIFICANT CHANGE UP (ref 3.8–10.5)

## 2022-07-25 PROCEDURE — 80053 COMPREHEN METABOLIC PANEL: CPT

## 2022-07-25 PROCEDURE — 99285 EMERGENCY DEPT VISIT HI MDM: CPT

## 2022-07-25 PROCEDURE — 36415 COLL VENOUS BLD VENIPUNCTURE: CPT

## 2022-07-25 PROCEDURE — 85025 COMPLETE CBC W/AUTO DIFF WBC: CPT

## 2022-07-25 PROCEDURE — 80307 DRUG TEST PRSMV CHEM ANLYZR: CPT

## 2022-07-25 PROCEDURE — 93005 ELECTROCARDIOGRAM TRACING: CPT

## 2022-07-25 PROCEDURE — 99284 EMERGENCY DEPT VISIT MOD MDM: CPT

## 2022-07-25 PROCEDURE — 93010 ELECTROCARDIOGRAM REPORT: CPT

## 2022-07-25 RX ORDER — ACETAMINOPHEN 500 MG
650 TABLET ORAL ONCE
Refills: 0 | Status: COMPLETED | OUTPATIENT
Start: 2022-07-25 | End: 2022-07-25

## 2022-07-25 RX ORDER — SODIUM CHLORIDE 9 MG/ML
1000 INJECTION, SOLUTION INTRAVENOUS ONCE
Refills: 0 | Status: DISCONTINUED | OUTPATIENT
Start: 2022-07-25 | End: 2022-07-25

## 2022-07-25 RX ADMIN — Medication 650 MILLIGRAM(S): at 15:16

## 2022-07-25 NOTE — ED PROVIDER NOTE - PROGRESS NOTE DETAILS
Wale See, PGY-3: Pt reexamined at bedside, resting comfortably, vitals stable.  Well appearing. Currently denying SI/HI. Demonstrates desire to get better, plans on going to crisis center tomorrow. Now feels at baseline, no longer having headache. Will d/c with family.

## 2022-07-25 NOTE — ED ADULT NURSE NOTE - OBJECTIVE STATEMENT
42 year old male BIB wife with anxiety; A&O; denies GEOVANI; denies AVH; denies ETOH and drug use. Pt was seen in ED on 7-22, initially had SI that day following panic attack that day as well as vague somatic complaints, low energy and decreased functioning, he had no prior HX, was given ativan 0.5mg and then discharged home with prescription for Lexapro and told to follow up at [facility]; today pt is denying any suicidal ideation but is still somatically preoccupied, c/o tiredness and lack of concentration; continue to monitor.

## 2022-07-25 NOTE — ED PROVIDER NOTE - NS ED ROS FT
General: fatigue  HENT: denies nasal congestion, rhinorrhea  Eyes: denies visual changes, blurred vision  CV: denies chest pain, palpitations  Resp: denies difficulty breathing, cough  Abdominal: denies nausea, vomiting, diarrhea, abdominal pain  : denies urinary pain or discharge  MSK: denies muscle aches, leg swelling  Neuro: denies headaches, numbness, tingling  Skin: denies rashes, bruises

## 2022-07-25 NOTE — ED PROVIDER NOTE - OBJECTIVE STATEMENT
41yo M w/ history of depression coming in w/ anxiety. Patient was seen three days ago for momentary SI and was prescribed lexipro 10mg and told to follow up with the crisis center. Patient states he was noting mild symptomatic improvement in anhedonia but awoke up this morning with no energy and was "feeling off". Took lexipro and come to the ED. Currently denying any SI/HI.

## 2022-07-25 NOTE — ED PROVIDER NOTE - PHYSICAL EXAMINATION
GENERAL: well appearing in no acute distress, non-toxic appearing  HEAD: normocephalic, atraumatic  HENT:airway intact, neck supple  EYES: normal conjunctiva, EOMI, PERRL  CARDIAC: regular rate and rhythm, normal S1S2, no appreciable murmurs, 2+ pulses in UE/LE b/l  PULM: normal breath sounds, clear to ascultation bilaterally, no rales, rhonchi, wheezing  GI: abdomen nondistended, soft, nontender, no guarding, rebound tenderness  : no CVA tenderness b/l, no suprapubic tenderness  NEURO: no focal motor or sensory deficits  MSK: no peripheral edema  SKIN: well-perfused, extremities warm, no visible rashes

## 2022-07-25 NOTE — ED PROVIDER NOTE - CLINICAL SUMMARY MEDICAL DECISION MAKING FREE TEXT BOX
43yo M w/ history of depression coming in w/ anxiety; will evaluate for any metabolic derangements and likely discuss with psych for medication adjustments

## 2022-07-25 NOTE — ED PROVIDER NOTE - NSFOLLOWUPINSTRUCTIONS_ED_ALL_ED_FT
Discharge instructions:    - Please follow up with your Primary Care Doctor within the next 3-5 days.    - Please follow up with the [facility].     - Take any prescribed medications as instructed:         SEEK IMMEDIATE MEDICAL CARE IF YOU HAVE ANY OF THE FOLLOWING SYMPTOMS: thoughts about hurting killing yourself, thoughts about hurting or killing somebody else, hallucinations, or worsening depression. Discharge instructions:    - Please follow up with your Primary Care Doctor within the next 3-5 days.    - Please follow up with the [facility].       SEEK IMMEDIATE MEDICAL CARE IF YOU HAVE ANY OF THE FOLLOWING SYMPTOMS: thoughts about hurting killing yourself, thoughts about hurting or killing somebody else, hallucinations, or worsening depression.

## 2022-07-25 NOTE — ED PROVIDER NOTE - INTERPRETATION
EKG reviewed for rate, rhythm, axis, intervals and segments, including QRS morphology, P wave appearance T wave appearance, VA interval, and QT interval.  I find the EKG to be unremarkable in all of these regards except as follows: R axis, incomplete R bundle

## 2022-07-25 NOTE — ED ADULT NURSE NOTE - PRIMARY CARE PROVIDER
unk

## 2022-07-25 NOTE — ED PROVIDER NOTE - NSDCPRINTRESULTS_ED_ALL_ED
Department of Anesthesiology  Postprocedure Note    Patient: April D Monika Maloney  MRN: [id number]  YOB: 1997  Date of evaluation: 6/22/2021  Time:  11:32 PM     Procedure Summary     Date: 06/22/21 Room / Location: [place] [address] 06 / [facility]    Anesthesia Start: 2142 Anesthesia Stop: 2256    Procedure: LAPAROSCOPIC LEFT SALPINGECTOMY, DILATATION AND CURRETTAGE (Left Abdomen) Diagnosis: (ECTOPIC PREGNANCY)    Surgeons: Shen Moncada DO Responsible Provider: Soheila Martinez MD    Anesthesia Type: general ASA Status: 1 - Emergent          Anesthesia Type: general    Michael Phase I: Michael Score: 9    Michael Phase II:      Last vitals: Reviewed and per EMR flowsheets. Anesthesia Post Evaluation    Patient location during evaluation: PACU  Patient participation: complete - patient participated  Level of consciousness: awake and alert  Airway patency: patent  Nausea & Vomiting: no nausea and no vomiting  Complications: no  Cardiovascular status: blood pressure returned to baseline  Respiratory status: acceptable  Hydration status: euvolemic  Comments: VSS on transfer to phase 2 recovery. No anesthetic complications. To get better and follow your care plan as instructed. Patient requests all Lab, Cardiology, and Radiology Results on their Discharge Instructions

## 2022-07-25 NOTE — ED PROVIDER NOTE - ATTENDING CONTRIBUTION TO CARE
41 yo male here 3d ago for anxiety, prescribed lexapro, sent home, returns for transiently improved and now worsened episodes of anxiety/akathisias today.  denies SI/HI.  calm, conversant, no distress.  check labs.  d/w psych -- continue lexapro, add PRN benzo, encourage outpatient follow-up @ [facility].

## 2022-07-25 NOTE — ED ADULT TRIAGE NOTE - CHIEF COMPLAINT QUOTE
Pt presents to ED with c/o anxiety and uneasiness that started this AM.  Pt has been taking lexapro for the last week.

## 2022-07-25 NOTE — ED PROVIDER NOTE - PATIENT PORTAL LINK FT
You can access the FollowMyHealth Patient Portal offered by [facility] by registering at the following website: [email or web address]. By joining [facility]’s FollowMyHealth portal, you will also be able to view your health information using other applications (apps) compatible with our system.

## 2022-07-26 ENCOUNTER — OUTPATIENT (OUTPATIENT)
Dept: OUTPATIENT SERVICES | Facility: HOSPITAL | Age: 42
LOS: 1 days | Discharge: STILL ADMITTED OR ADMINISTRATIVE | End: 2022-07-26

## 2022-07-26 PROBLEM — F41.9 ANXIETY DISORDER, UNSPECIFIED: Chronic | Status: ACTIVE | Noted: 2022-07-25

## 2022-07-26 PROCEDURE — 90839 PSYTX CRISIS INITIAL 60 MIN: CPT | Mod: 1L,95

## 2022-07-27 DIAGNOSIS — F32.A DEPRESSION, UNSPECIFIED: ICD-10-CM

## 2022-07-27 DIAGNOSIS — R53.83 OTHER FATIGUE: ICD-10-CM

## 2022-08-09 PROCEDURE — 99214 OFFICE O/P EST MOD 30 MIN: CPT | Mod: 95

## 2023-06-01 ENCOUNTER — INPATIENT (INPATIENT)
Facility: HOSPITAL | Age: 43
LOS: 14 days | Discharge: HOME | End: 2023-06-16
Attending: STUDENT IN AN ORGANIZED HEALTH CARE EDUCATION/TRAINING PROGRAM | Admitting: PSYCHIATRY & NEUROLOGY
Payer: COMMERCIAL

## 2023-06-01 VITALS
SYSTOLIC BLOOD PRESSURE: 140 MMHG | TEMPERATURE: 98 F | RESPIRATION RATE: 16 BRPM | OXYGEN SATURATION: 100 % | HEART RATE: 87 BPM | DIASTOLIC BLOOD PRESSURE: 95 MMHG

## 2023-06-01 DIAGNOSIS — F32.9 MAJOR DEPRESSIVE DISORDER, SINGLE EPISODE, UNSPECIFIED: ICD-10-CM

## 2023-06-01 LAB
ALBUMIN SERPL ELPH-MCNC: 5.2 G/DL — HIGH (ref 3.3–5)
ALP SERPL-CCNC: 64 U/L — SIGNIFICANT CHANGE UP (ref 40–120)
ALT FLD-CCNC: 15 U/L — SIGNIFICANT CHANGE UP (ref 4–41)
AMPHET UR-MCNC: NEGATIVE — SIGNIFICANT CHANGE UP
ANION GAP SERPL CALC-SCNC: 12 MMOL/L — SIGNIFICANT CHANGE UP (ref 7–14)
APAP SERPL-MCNC: <10 UG/ML — LOW (ref 15–25)
APPEARANCE UR: CLEAR — SIGNIFICANT CHANGE UP
AST SERPL-CCNC: 21 U/L — SIGNIFICANT CHANGE UP (ref 4–40)
B PERT DNA SPEC QL NAA+PROBE: SIGNIFICANT CHANGE UP
B PERT+PARAPERT DNA PNL SPEC NAA+PROBE: SIGNIFICANT CHANGE UP
BARBITURATES UR SCN-MCNC: NEGATIVE — SIGNIFICANT CHANGE UP
BASOPHILS # BLD AUTO: 0.07 K/UL — SIGNIFICANT CHANGE UP (ref 0–0.2)
BASOPHILS NFR BLD AUTO: 1 % — SIGNIFICANT CHANGE UP (ref 0–2)
BENZODIAZ UR-MCNC: NEGATIVE — SIGNIFICANT CHANGE UP
BILIRUB SERPL-MCNC: 0.5 MG/DL — SIGNIFICANT CHANGE UP (ref 0.2–1.2)
BILIRUB UR-MCNC: NEGATIVE — SIGNIFICANT CHANGE UP
BORDETELLA PARAPERTUSSIS (RAPRVP): SIGNIFICANT CHANGE UP
BUN SERPL-MCNC: 14 MG/DL — SIGNIFICANT CHANGE UP (ref 7–23)
C PNEUM DNA SPEC QL NAA+PROBE: SIGNIFICANT CHANGE UP
CALCIUM SERPL-MCNC: 10 MG/DL — SIGNIFICANT CHANGE UP (ref 8.4–10.5)
CHLORIDE SERPL-SCNC: 100 MMOL/L — SIGNIFICANT CHANGE UP (ref 98–107)
CO2 SERPL-SCNC: 25 MMOL/L — SIGNIFICANT CHANGE UP (ref 22–31)
COCAINE METAB.OTHER UR-MCNC: NEGATIVE — SIGNIFICANT CHANGE UP
COLOR SPEC: YELLOW — SIGNIFICANT CHANGE UP
CREAT SERPL-MCNC: 0.88 MG/DL — SIGNIFICANT CHANGE UP (ref 0.5–1.3)
CREATININE URINE RESULT, DAU: 146 MG/DL — SIGNIFICANT CHANGE UP
DIFF PNL FLD: NEGATIVE — SIGNIFICANT CHANGE UP
EGFR: 109 ML/MIN/1.73M2 — SIGNIFICANT CHANGE UP
EOSINOPHIL # BLD AUTO: 0.16 K/UL — SIGNIFICANT CHANGE UP (ref 0–0.5)
EOSINOPHIL NFR BLD AUTO: 2.2 % — SIGNIFICANT CHANGE UP (ref 0–6)
ETHANOL SERPL-MCNC: <10 MG/DL — SIGNIFICANT CHANGE UP
FLUAV SUBTYP SPEC NAA+PROBE: SIGNIFICANT CHANGE UP
FLUBV RNA SPEC QL NAA+PROBE: SIGNIFICANT CHANGE UP
GLUCOSE SERPL-MCNC: 109 MG/DL — HIGH (ref 70–99)
GLUCOSE UR QL: NEGATIVE — SIGNIFICANT CHANGE UP
HADV DNA SPEC QL NAA+PROBE: SIGNIFICANT CHANGE UP
HCOV 229E RNA SPEC QL NAA+PROBE: SIGNIFICANT CHANGE UP
HCOV HKU1 RNA SPEC QL NAA+PROBE: SIGNIFICANT CHANGE UP
HCOV NL63 RNA SPEC QL NAA+PROBE: SIGNIFICANT CHANGE UP
HCOV OC43 RNA SPEC QL NAA+PROBE: SIGNIFICANT CHANGE UP
HCT VFR BLD CALC: 38 % — LOW (ref 39–50)
HGB BLD-MCNC: 12.7 G/DL — LOW (ref 13–17)
HMPV RNA SPEC QL NAA+PROBE: SIGNIFICANT CHANGE UP
HPIV1 RNA SPEC QL NAA+PROBE: SIGNIFICANT CHANGE UP
HPIV2 RNA SPEC QL NAA+PROBE: SIGNIFICANT CHANGE UP
HPIV3 RNA SPEC QL NAA+PROBE: SIGNIFICANT CHANGE UP
HPIV4 RNA SPEC QL NAA+PROBE: SIGNIFICANT CHANGE UP
IANC: 4 K/UL — SIGNIFICANT CHANGE UP (ref 1.8–7.4)
IMM GRANULOCYTES NFR BLD AUTO: 0.3 % — SIGNIFICANT CHANGE UP (ref 0–0.9)
KETONES UR-MCNC: ABNORMAL
LEUKOCYTE ESTERASE UR-ACNC: NEGATIVE — SIGNIFICANT CHANGE UP
LYMPHOCYTES # BLD AUTO: 2.57 K/UL — SIGNIFICANT CHANGE UP (ref 1–3.3)
LYMPHOCYTES # BLD AUTO: 35.2 % — SIGNIFICANT CHANGE UP (ref 13–44)
M PNEUMO DNA SPEC QL NAA+PROBE: SIGNIFICANT CHANGE UP
MCHC RBC-ENTMCNC: 28.1 PG — SIGNIFICANT CHANGE UP (ref 27–34)
MCHC RBC-ENTMCNC: 33.4 GM/DL — SIGNIFICANT CHANGE UP (ref 32–36)
MCV RBC AUTO: 84.1 FL — SIGNIFICANT CHANGE UP (ref 80–100)
METHADONE UR-MCNC: NEGATIVE — SIGNIFICANT CHANGE UP
MONOCYTES # BLD AUTO: 0.49 K/UL — SIGNIFICANT CHANGE UP (ref 0–0.9)
MONOCYTES NFR BLD AUTO: 6.7 % — SIGNIFICANT CHANGE UP (ref 2–14)
NEUTROPHILS # BLD AUTO: 4 K/UL — SIGNIFICANT CHANGE UP (ref 1.8–7.4)
NEUTROPHILS NFR BLD AUTO: 54.6 % — SIGNIFICANT CHANGE UP (ref 43–77)
NITRITE UR-MCNC: NEGATIVE — SIGNIFICANT CHANGE UP
NRBC # BLD: 0 /100 WBCS — SIGNIFICANT CHANGE UP (ref 0–0)
NRBC # FLD: 0 K/UL — SIGNIFICANT CHANGE UP (ref 0–0)
OPIATES UR-MCNC: NEGATIVE — SIGNIFICANT CHANGE UP
OXYCODONE UR-MCNC: NEGATIVE — SIGNIFICANT CHANGE UP
PCP SPEC-MCNC: SIGNIFICANT CHANGE UP
PCP UR-MCNC: NEGATIVE — SIGNIFICANT CHANGE UP
PH UR: 6.5 — SIGNIFICANT CHANGE UP (ref 5–8)
PLATELET # BLD AUTO: 237 K/UL — SIGNIFICANT CHANGE UP (ref 150–400)
POTASSIUM SERPL-MCNC: 3.9 MMOL/L — SIGNIFICANT CHANGE UP (ref 3.5–5.3)
POTASSIUM SERPL-SCNC: 3.9 MMOL/L — SIGNIFICANT CHANGE UP (ref 3.5–5.3)
PROT SERPL-MCNC: 7 G/DL — SIGNIFICANT CHANGE UP (ref 6–8.3)
PROT UR-MCNC: ABNORMAL
RAPID RVP RESULT: SIGNIFICANT CHANGE UP
RBC # BLD: 4.52 M/UL — SIGNIFICANT CHANGE UP (ref 4.2–5.8)
RBC # FLD: 12.2 % — SIGNIFICANT CHANGE UP (ref 10.3–14.5)
RSV RNA SPEC QL NAA+PROBE: SIGNIFICANT CHANGE UP
RV+EV RNA SPEC QL NAA+PROBE: SIGNIFICANT CHANGE UP
SALICYLATES SERPL-MCNC: <0.3 MG/DL — LOW (ref 15–30)
SARS-COV-2 RNA SPEC QL NAA+PROBE: SIGNIFICANT CHANGE UP
SODIUM SERPL-SCNC: 137 MMOL/L — SIGNIFICANT CHANGE UP (ref 135–145)
SP GR SPEC: 1.02 — SIGNIFICANT CHANGE UP (ref 1.01–1.05)
THC UR QL: NEGATIVE — SIGNIFICANT CHANGE UP
TOXICOLOGY SCREEN, DRUGS OF ABUSE, SERUM RESULT: SIGNIFICANT CHANGE UP
TSH SERPL-MCNC: 6.01 UIU/ML — HIGH (ref 0.27–4.2)
UROBILINOGEN FLD QL: SIGNIFICANT CHANGE UP
WBC # BLD: 7.31 K/UL — SIGNIFICANT CHANGE UP (ref 3.8–10.5)
WBC # FLD AUTO: 7.31 K/UL — SIGNIFICANT CHANGE UP (ref 3.8–10.5)

## 2023-06-01 PROCEDURE — 99285 EMERGENCY DEPT VISIT HI MDM: CPT

## 2023-06-01 PROCEDURE — 93010 ELECTROCARDIOGRAM REPORT: CPT

## 2023-06-01 RX ORDER — HALOPERIDOL DECANOATE 100 MG/ML
5 INJECTION INTRAMUSCULAR EVERY 4 HOURS
Refills: 0 | Status: DISCONTINUED | OUTPATIENT
Start: 2023-06-02 | End: 2023-06-16

## 2023-06-01 RX ORDER — LEVOTHYROXINE SODIUM 125 MCG
25 TABLET ORAL DAILY
Refills: 0 | Status: DISCONTINUED | OUTPATIENT
Start: 2023-06-02 | End: 2023-06-16

## 2023-06-01 RX ORDER — HALOPERIDOL DECANOATE 100 MG/ML
5 INJECTION INTRAMUSCULAR ONCE
Refills: 0 | Status: DISCONTINUED | OUTPATIENT
Start: 2023-06-02 | End: 2023-06-16

## 2023-06-01 RX ORDER — DIPHENHYDRAMINE HCL 50 MG
50 CAPSULE ORAL ONCE
Refills: 0 | Status: DISCONTINUED | OUTPATIENT
Start: 2023-06-02 | End: 2023-06-16

## 2023-06-01 RX ORDER — BUPROPION HYDROCHLORIDE 150 MG/1
300 TABLET, EXTENDED RELEASE ORAL DAILY
Refills: 0 | Status: DISCONTINUED | OUTPATIENT
Start: 2023-06-02 | End: 2023-06-04

## 2023-06-01 RX ORDER — DIPHENHYDRAMINE HCL 50 MG
50 CAPSULE ORAL EVERY 4 HOURS
Refills: 0 | Status: DISCONTINUED | OUTPATIENT
Start: 2023-06-02 | End: 2023-06-16

## 2023-06-01 NOTE — ED BEHAVIORAL HEALTH ASSESSMENT NOTE - DIFFERENTIAL
MDD vs. adjustment disorder with depressed and anxious mood vs. somatic symptom disorder MDD vs. adjustment disorder with depressed and anxious mood

## 2023-06-01 NOTE — ED ADULT NURSE NOTE - OBJECTIVE STATEMENT
Pt received to ; presents calm and cooperative. Pt states hx of depression and anxiety, presently taking Lexapro and Wellbutrin as directed; states that for the last 2 weeks he feels as though his medication is no longer effective. Today pt endorses not be able to go to work due to severe depression and has been experiencing suicidal thoughts because "things feel like they are spiraling out of control". Pt denies plan or intent citing two sons as a protective factor. Pt denies HI. Pts belongings secured for safety. Pt awaiting psychiatric evaluation.

## 2023-06-01 NOTE — ED ADULT NURSE NOTE - SUICIDE SCREENING QUESTION 2
----- Message from Renaldo Cannon sent at 4/20/2022  9:02 AM CDT -----  Contact: Patient [phone]  Pt called in requesting to speak with a nurse she has a concern please advise    
Patient has audio appt today  
Yes

## 2023-06-01 NOTE — ED BEHAVIORAL HEALTH ASSESSMENT NOTE - SUMMARY
42M with PMHx head injury w/ LOC 10 yrs ago and chronic fatigue of unknown etiology, PPHx depression treated per Neuro with antidepressants, not currently on psychiatric medications, never in formal outpatient psychiatric treatment, without history of suicidality/suicide attempt, who present to the [facility] ED accompanied by his wife for episode of dysphoria with intense fatigue and passive suicidal ideation.    Patient cooperative/linear/oriented and appearing distressed. States he had a brief "breakdown" this morning and denies currently experienced suicidal ideation/intent/plan as well as history of suicidal ideation/intent/plan. Names children as major protective factors. Reports long history of fatigue of unknown etiology now with concomitant anhedonia, difficulty concentrating, difficulty functioning in his normal fashion, and depressed/irritable mood. Declines voluntary psychiatric admission and amenable to outpatient psychiatric follow-up/medications as he undergoes work-up per Neurology and Rheumatology for chronic fatigue. Denies substance use, suicide attempts, and kate/psychosis. No acute safety concerns per wife. Psychiatrically cleared for discharge to outpatient level of psychiatric care. SW engaged for further referrals/support.     RECOMMENDATIONS:  1) Provide 7 day supply of Lexapro 10mg PO qD as bridge to outpatient psychiatric care; patient may cut pills in half for 5mg qD if poor tolerability. Discussed risks/benefits to tx with Lexapro; pt expresses understanding.  2) Pt to follow-up with [facility] to establish outpatient psychiatric care. 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     Patient with worsening depressed and anxious mood with no acute stressors.  Endorses feeling hopeless due to multiple medication trials leading to passive suicidal ideations.  Patient is an acute danger to self and others at this time.  Patient lacks insight and judgment into illness and remains an acute safety risk and warrants inpatient psychiatric hospitalization for safety and stabilization.

## 2023-06-01 NOTE — ED BEHAVIORAL HEALTH ASSESSMENT NOTE - NSSUICPROTFACT_PSY_ALL_CORE
Responsibility to children, family, or others/Identifies reasons for living/Supportive social network of family or friends/Engaged in work or school Responsibility to children, family, or others/Identifies reasons for living/Supportive social network of family or friends/Engaged in work or school/Confucianism beliefs

## 2023-06-01 NOTE — ED BEHAVIORAL HEALTH ASSESSMENT NOTE - PSYCHIATRIC ISSUES AND PLAN (INCLUDE STANDING AND PRN MEDICATION)
PRNs Haldol 5mg IM/PO, Ativan 2mg IM/PO, Benadryl 50mg IM/PO, continue home medications: lexapro 20mg and wellbutrin 400mg. defer changes to primary team

## 2023-06-01 NOTE — ED ADULT NURSE NOTE - NSFALLRISKINTERV_ED_ALL_ED
Communicate fall risk and risk factors to all staff, patient, and family/Provide visual cue: yellow wristband, yellow gown, etc/Reinforce activity limits and safety measures with patient and family/Non-slip footwear applied when patient is off stretcher/Physically safe environment - no spills, clutter or unnecessary equipment/Purposeful Proactive Rounding

## 2023-06-01 NOTE — ED BEHAVIORAL HEALTH ASSESSMENT NOTE - DETAILS
3M, 6M states sister with OCD see HPI patient and wife aware of disposition and plans spoke with Dr Oakley

## 2023-06-01 NOTE — ED BEHAVIORAL HEALTH ASSESSMENT NOTE - NSBHATTESTCOMMENTATTENDFT_PSY_A_CORE
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  Pt states he was doing well on Lexapro but came off of it 1 year ago; in the past year has noticed progressively increasing fatigue, now finds difficulty even to sit up.  States this morning he felt so frustrated by his condition he "wanted it all to stop" for about 1 minute, and came to ED for c/o passive SI.  States SI completely resolved; he would never harm himself 2/2 love of his children and family.  States he has been feeling anxious, considering restarting Lexapro, also pending a f/u appointment with his neurologist to continue his workup on Monday.  Denies AVH, paranoia, substance abuse, or access to guns.  Pt does not wish to be admitted to psychiatry, does not meet criteria for involuntary commitment.  Wife at bedside, denies acute safety concerns.  Pt and wife aware to call 911 or return to ER if imminent risk of harm to self/others.  Dx: adjustment disorder.  Recs: Can restart Lexapro 5mg PO daily,  for OP psych referral resources and info for [facility] walk-in clinic.  Agree with fellow's assessment and plan as above.

## 2023-06-01 NOTE — ED BEHAVIORAL HEALTH NOTE - BEHAVIORAL HEALTH NOTE
COVID Exposure Screen- Patient    Have you been tested for COVID-19 in the last 90 days?  (  ) Yes  ( x ) No   (  ) Unknown- Reason: _____  IF YES: Date of test(s), type of test(s), result(s) for ALL tests in last 90 days: ________    In the past 10 days, have you been around anyone with a positive COVID-19 test? (  ) Yes  ( x ) No   (  ) Unknown- Reason: ____  IF YES: Were you closer than 6 feet of them for a total of 15 minutes or more in a 24 hour period? (  ) Yes   (  ) No   ( ) Unknown- Reason: _____

## 2023-06-01 NOTE — ED BEHAVIORAL HEALTH ASSESSMENT NOTE - NSBHATTESTBILLING_PSY_A_CORE
79642-Ppddumhrokg diagnostic evaluation with medical services [id number]-Emergency department visit - high complexity

## 2023-06-01 NOTE — ED ADULT TRIAGE NOTE - CHIEF COMPLAINT QUOTE
Pt. c/o suicidal ideations and anxiety for the past couple of days. Denies any suicidal plan, denies auditory or visual hallucinations. Denies any drug or alcohol use. MD Riojas called for  evaluation. Patient to be seen in .

## 2023-06-01 NOTE — ED BEHAVIORAL HEALTH ASSESSMENT NOTE - HPI (INCLUDE ILLNESS QUALITY, SEVERITY, DURATION, TIMING, CONTEXT, MODIFYING FACTORS, ASSOCIATED SIGNS AND SYMPTOMS)
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     Patient seen and examined. Cooperative, intermittently appearing distressed/breathless. States he was disturbed by the passive suicidal thoughts he had this morning in the context of dysphoria/severe fatigue, calling the episode a "breakdown." States he has no passive/active suicidal ideation/intent/plan, naming his children as his most significant protective factor. Expresses distress at his prolonged symptoms and states he wishes to get better; states he is coming to accept that depression may be a contributor to his current symptoms. Describes history of intermittent severe fatigue over the past seven years; currently experiencing one year of persistent fatigue with difficulty concentrating, preoccupying anxiety about the fatigue, difficulty sustaining normal level of functioning, and anhedonia. Has more acutely developed intermittently sad and irritable mood. Denies significant insomnia. No psychosis/kate. Patient visibly uncomfortable throughout much of interview; accepts lorazepam 0.5mg PO PRN acute anxiety. Expresses commitment to outpatient follow-up for medication management/counseling.    Patient reports that while he has never been in formal psychiatric treatment, his neurologist has treated him for depression in the past with Lexapro, which worked well. He self-discontinued this one year ago as he was concerned about long-term side effects. He also had a previous trial of low-dose synthroid for his fatigue which he discontinued one year ago. Believes he had a brief trial of one other antidepressant but is not sure what it was called. Denies psychiatric hospitalizations, suicide attempts, history of aggression, legal history, and access to firearm. Denies history of abuse/trauma beyond head injury 10 yrs ago (occurred during sports game).        Per collateral information from wife, reports worsening anxiety and depression over the past 2 weeks.  States that he was crying today and has been uninhibited by his emotions but states that he was overtly suicidal/homicidal ideations, intent or plan and stated that he wanted to go to the hospital.  Reports that he has a lot of downs.  Reports going to another provider today and wanted a second opinion and feeling hopeless with prognosis and treatment options.  Denies acute stressors. 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     Patient states that he has been feeling depressed and started on lexapro in July 2022.  States that he has been having worsening depression and anxiety over the past few days.  Denies any acute stressors.  States that lately he has just been feeling more down, unmotivated, and unable to function at work endorsing that he has used up all his sick days. He states that he has been getting worsening passive suicidal thoughts and is worried that he may act on it.  He continues to endorse that he has been feeling distressed and restless.  Expressed frustration with his treatment.  States that he was seeing an NP who started him on lexapro and then wellbutrin and then added olanzepine for sleep which made him more agitated.  States that he was briefly on Abilify from 5mg to 10mg and became restless and was eventually discontinued.  States that he felt momentarily brief effectiveness of treatment.  Recently he sought a second opinion with [facility] who suggested trial with Zoloft.  Reports feeling hopeless and wanting to come to the hospital for help before things get worse    Patient reports depressive symptoms including depressed mood, anhedonia, poor energy/concentration/decreased appetite, with frequent sleep disturbances and middle insomnia. Patient reports symptoms of anxiety including anxious mood, symptoms of panic disorder. He denies social anxiety or social stressors.  Patient denies any psychotic symptoms including paranoia or auditory/visual hallucinations. Patient denies active suicidal/homicidal ideations, intent or plans. States that he just wants to get help.     Per collateral information from wife, reports worsening anxiety and depression over the past 2 weeks.  States that he was crying today and has been uninhibited by his emotions but states that he was overtly suicidal/homicidal ideations, intent or plan and stated that he wanted to go to the hospital.  Reports that he has a lot of downs.  Reports going to another provider today and wanted a second opinion and feeling hopeless with prognosis and treatment options.  Denies acute stressors.

## 2023-06-01 NOTE — ED BEHAVIORAL HEALTH ASSESSMENT NOTE - RISK ASSESSMENT
Patient is at low acute and low chronic risk of harm. Acute risk factors include anxiety, depressed mood, and distress from somatic symptoms. Chronic risk factors include prior diagnosis of depression. No history of psychiatric hospitalization or suicide attempt. Significant protective factors, including sense of responsibility to children/wife, supportive relationships, treatment/help-seeking behavior, future-oriented thinking, sobriety, residential stability, and employment stability. Wife denies acute safety concerns. Patient declines voluntary inpatient hospitalization; as he is not at imminent risk of harm, he does not meet criteria for involuntary psychiatric hospitalization. He is safe and appropriate for follow-up with psychiatric care as an outpatient. Patient is at low acute and low chronic risk of harm. Acute risk factors include anxiety, depressed mood, and distress. Chronic risk factors include prior diagnosis of depression. No history of psychiatric hospitalization or suicide attempt. Significant protective factors, including sense of responsibility to children/wife, supportive relationships, treatment/help-seeking behavior, future-oriented thinking, sobriety, residential stability, and employment stability.

## 2023-06-01 NOTE — ED BEHAVIORAL HEALTH ASSESSMENT NOTE - DESCRIPTION
Patient was calm, pleasant and cooperative in the ED and did not exhibit any aggression. Patient did not require any PRN medications or any physical restraints.     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

## 2023-06-01 NOTE — ED PROVIDER NOTE - CLINICAL SUMMARY MEDICAL DECISION MAKING FREE TEXT BOX
Mr. Hernandez is a 43-year-old man who comes in with a history of depression with suicidal ideation.  He has a history of hypothyroidism and takes Synthroid otherwise takes no other medications other than his psychiatric medications.  He is a clean and tidy man sitting in the room and his gown.  He says the suicidal ideations have been increasing.  He states he did not take any pills today or try to cut himself or otherwise harm himself today.  He has admissions to [facility] in the past.    EKG is normal sinus rhythm with rightward axis and incomplete right bundle branch block.  Heart rate is 73 QTc is 423    Pt alert and can phonate well  h at/nc  perrl, conj clear, sclera anicteric,  neck supple  abd soft no r/g/t  ext no edema no deformities  neueo awake, lucid normal gait moves all extremities with strength  psych normal affect  vs reasonable    Psych is evaluating the patient for his depression and suicidality.  Labs are drawn in case the patient needs to be admitted.  There are no acute medical issues that would currently prevent him from being admitted for psychiatry

## 2023-06-01 NOTE — ED BEHAVIORAL HEALTH NOTE - BEHAVIORAL HEALTH NOTE
Writer informed patient’s wife Julia  of patient’s admission to [facility].

## 2023-06-02 PROCEDURE — 99231 SBSQ HOSP IP/OBS SF/LOW 25: CPT | Mod: GC

## 2023-06-02 RX ORDER — ESCITALOPRAM OXALATE 10 MG/1
20 TABLET, FILM COATED ORAL DAILY
Refills: 0 | Status: DISCONTINUED | OUTPATIENT
Start: 2023-06-02 | End: 2023-06-02

## 2023-06-02 RX ORDER — MIRTAZAPINE 45 MG/1
7.5 TABLET, ORALLY DISINTEGRATING ORAL AT BEDTIME
Refills: 0 | Status: DISCONTINUED | OUTPATIENT
Start: 2023-06-02 | End: 2023-06-05

## 2023-06-02 RX ORDER — VENLAFAXINE HCL 75 MG
37.5 CAPSULE, EXT RELEASE 24 HR ORAL DAILY
Refills: 0 | Status: DISCONTINUED | OUTPATIENT
Start: 2023-06-02 | End: 2023-06-04

## 2023-06-02 RX ORDER — ESCITALOPRAM OXALATE 10 MG/1
15 TABLET, FILM COATED ORAL DAILY
Refills: 0 | Status: DISCONTINUED | OUTPATIENT
Start: 2023-06-02 | End: 2023-06-04

## 2023-06-02 RX ADMIN — Medication 37.5 MILLIGRAM(S): at 11:41

## 2023-06-02 RX ADMIN — ESCITALOPRAM OXALATE 15 MILLIGRAM(S): 10 TABLET, FILM COATED ORAL at 11:40

## 2023-06-02 RX ADMIN — MIRTAZAPINE 7.5 MILLIGRAM(S): 45 TABLET, ORALLY DISINTEGRATING ORAL at 20:22

## 2023-06-02 NOTE — BH INPATIENT PSYCHIATRY ASSESSMENT NOTE - NSBHASSESSSUMMFT_PSY_ALL_CORE
Dante David is a 44 y/o male        Dante Hernandez is a 44 y/o male, domiciled with his wife and 2 sons, with a history of treatment for depression since July 2022  with Lexapro, Wellbutrin, Abilify, and Prestiq, a TBI in 2010, and hypothyroidism, presenting with worsening symptoms of depression, loss of hope, and recurrent suicidal ideation. Presentation concerning for MDD, melancholia    Plan:  1. Legals: 9.13 status  2. Safety: routine observation, denies SI/HI/I/P on the unit. Haldol 5 mg/Ativan 2 mg/Benadryl 50 mg PO q6h/IM PRN for agitation  3. Psychiatric:   	c/w buproprion XL (24-Hour) 300 mg po qd  	c/w escitalopram 15 mg po qD, taper down slowly  	mirtazapine 7.5 mg po qHS  	venlafaxine XR 37.5 mg po qD  	ECT   4. Group, milieu, individual therapy as appropriate.  5. Medical: Hypothyroidism, Levothyroxine 25 mcg, po qD  	Should obtain thyroid level  6. Dispo: pending clinical improvement.  Patient requires inpatient hospitalization for stabilization and safety. Dante Hernandez is a 42 y/o male, domiciled with his wife and 2 sons, with a history of treatment for depression since July 2022  with Lexapro, Wellbutrin, Abilify, and Prestiq, a TBI in 2010, and hypothyroidism, presenting with worsening symptoms of depression, loss of hope, and recurrent suicidal ideation. 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    Plan:  1. Legals: 9.13 status  2. Safety: routine observation, denies SI/HI/I/P on the unit. Haldol 5 mg/Ativan 2 mg/Benadryl 50 mg PO q6h/IM PRN for agitation  3. Psychiatric:   	c/w buproprion XL (24-Hour) 300 mg po qd  	c/w escitalopram 15 mg po qD, taper down slowly  	mirtazapine 7.5 mg po qHS  	venlafaxine XR 37.5 mg po qD  	ECT   4. Group, milieu, individual therapy as appropriate.  5. Medical: Hypothyroidism, Levothyroxine 25 mcg, po qD  	Should obtain thyroid level  6. Dispo: pending clinical improvement.  Patient requires inpatient hospitalization for stabilization and safety.

## 2023-06-02 NOTE — BH PATIENT PROFILE - HOME MEDICATIONS
Ativan 1 mg oral tablet , 1 tab(s) orally 3 times a day MDD:Please use only as needed  escitalopram 10 mg oral tablet , 1 tab(s) orally once a day

## 2023-06-02 NOTE — BH INPATIENT PSYCHIATRY ASSESSMENT NOTE - NSBHMETABOLIC_PSY_ALL_CORE_FT
BMI: BMI (kg/m2): 23.4 (06-02-23 @ 06:53)  HbA1c:   Glucose:   BP: 113/73 (06-01-23 @ 23:58) (113/73 - 140/95)  Lipid Panel:

## 2023-06-02 NOTE — BH INPATIENT PSYCHIATRY ASSESSMENT NOTE - NSBHCHARTREVIEWLAB_PSY_A_CORE FT
CBC Full  -  ( 01 Jun 2023 21:25 )  WBC Count : 7.31 K/uL  RBC Count : 4.52 M/uL  Hemoglobin : 12.7 g/dL  Hematocrit : 38.0 %  Platelet Count - Automated : 237 K/uL  Mean Cell Volume : 84.1 fL  Mean Cell Hemoglobin : 28.1 pg  Mean Cell Hemoglobin Concentration : 33.4 gm/dL  Auto Neutrophil # : 4.00 K/uL  Auto Lymphocyte # : 2.57 K/uL  Auto Monocyte # : 0.49 K/uL  Auto Eosinophil # : 0.16 K/uL  Auto Basophil # : 0.07 K/uL  Auto Neutrophil % : 54.6 %  Auto Lymphocyte % : 35.2 %  Auto Monocyte % : 6.7 %  Auto Eosinophil % : 2.2 %  Auto Basophil % : 1.0 %

## 2023-06-02 NOTE — ECT CONSULT NOTE - NSECTPASTTXTRIALSDETAILS_PSY_ALL_CORE
Lexapro, Pristiq, Welbutrin, Lamictal, Zyprexa (one dose 2.5 mg gave agitation, restlessness), Abilify, Vraylar, Kangity

## 2023-06-02 NOTE — PSYCHIATRIC REHAB INITIAL EVALUATION - NSBHPRRECOMMEND_PSY_ALL_CORE
Writer met with patient to orient to unit and introduce self, psychiatric rehabilitation staff and department functions. Patient was provided a copy of the unit schedule. On interview, patient was polite and cooperative, with low mood and hopelessness. Patient presented with appropriate ADL’s and eye contact. Patient demonstrated fair insight and judgement into his symptoms and treatment at this time. Writer encouraged patient to attend psychiatric rehabilitation groups and engage in treatment. Writer and patient were able to establish a collaborative psychiatric rehabilitation goal. Psychiatric Rehabilitation staff will continue to engage patient daily in order to develop therapeutic rapport.

## 2023-06-02 NOTE — BH INPATIENT PSYCHIATRY ASSESSMENT NOTE - NSCOMMENTSUICRISKFACT_PSY_ALL_CORE
Persistent melancholic depression increases risk, risk mitigated by family, engaged in work, and no history of SA

## 2023-06-02 NOTE — BH INPATIENT PSYCHIATRY ASSESSMENT NOTE - NSBHCHARTREVIEWVS_PSY_A_CORE FT
Vital Signs Last 24 Hrs  T(C): 36.7 (06-02-23 @ 06:53), Max: 36.8 (06-01-23 @ 20:19)  T(F): 98 (06-02-23 @ 06:53), Max: 98.3 (06-01-23 @ 20:19)  HR: 71 (06-01-23 @ 23:58) (71 - 87)  BP: 113/73 (06-01-23 @ 23:58) (113/73 - 140/95)  BP(mean): --  RR: 16 (06-01-23 @ 23:58) (16 - 16)  SpO2: 100% (06-01-23 @ 23:58) (100% - 100%)    Orthostatic VS  06-02-23 @ 06:53  Lying BP: --/-- HR: --  Sitting BP: 112/80 HR: 77  Standing BP: 123/97 HR: 84  Site: --  Mode: --   Vital Signs Last 24 Hrs  T(C): 36.7 (06-02-23 @ 06:53), Max: 36.7 (06-02-23 @ 06:53)  T(F): 98 (06-02-23 @ 06:53), Max: 98 (06-02-23 @ 06:53)  HR: 71 (06-01-23 @ 23:58) (71 - 71)  BP: 113/73 (06-01-23 @ 23:58) (113/73 - 113/73)  BP(mean): --  RR: 16 (06-01-23 @ 23:58) (16 - 16)  SpO2: 100% (06-01-23 @ 23:58) (100% - 100%)    Orthostatic VS  06-02-23 @ 06:53  Lying BP: --/-- HR: --  Sitting BP: 112/80 HR: 77  Standing BP: 123/97 HR: 84  Site: --  Mode: --

## 2023-06-02 NOTE — BH INPATIENT PSYCHIATRY ASSESSMENT NOTE - DESCRIPTION
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 He states he has no environmental stressors at home and that he has a lot of support at home from his wife. In addition to his immediate family, he is touch with his mother and two sisters. He has a social network of friends and a  at his [employer].     He consumes alcohol recreationally and says he has no history of nicotine, marijuana, or other substance use. He states he does not have a gun at home.

## 2023-06-02 NOTE — DIETITIAN INITIAL EVALUATION ADULT - OTHER INFO
Patient is a 43 year old male; PMHx TBI with LOC in 2010, previously engaged in outpatient work-up for persistent fatigue primarily with Neurology and  Rheumatology, PPHx depression with prior trials of Lexapro and Wellbutrin and Abilify per neurology, denying substance abuse hx, history of suicidality/suicide attempt, who presents with worsening depression and anxiety with suicidal ideations. To begin ECT.  Spoke to patient in the day room. Patient states appetite has "not been great". Reports allergy to shellfish, fresh fruits and raw vegetables- can eat them cooked, can drink juices. Food preferences obtained. Reports 5# weight loss. Denies GI distress. Requesting Orgain shake x2/day, does not want Ensure as he thinks he may have sensitivity to soy. Would like to be on a multivitamin. PO intake encouraged.

## 2023-06-02 NOTE — BH INPATIENT PSYCHIATRY ASSESSMENT NOTE - RISK ASSESSMENT
Mr. Hernandez has been admitted due to worsening depression. He describes thoughts of wanting to die and kill himself and endorses difficulty controlling these thoughts. He says he feels hopeless and fears being unable to get to the point where treatment provided inpatient will provide relief to his symptoms. He has a moderate acute suicide risk.     Mr. Hernandez's hopelessness springs not only from his disease but also from his past history of only transient relief from his symptoms. He has tried multiple medications at a variety of doses and has not improved, leading him to believe he has no hope. He describes recurrent thoughts of dying and worries that he will not make it much longer.     His protective risk factors are numerous. He is  with two children and has a stable home environment with strong social support. He speaks about his mother and two sisters as additional supports in his life. 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 He describes having friends and a  at his [employer] as other people who he values in his life. He does not have a gun in his home.     He has no history of violent ideation or action. He does not have a gun in his home and says that he has never thought about hurting others.

## 2023-06-02 NOTE — BH INPATIENT PSYCHIATRY ASSESSMENT NOTE - MSE UNSTRUCTURED FT
Appearance: Appears stated age, well groomed with good hygiene, distressed  Behavior: Cooperative with interview, makes good eye contact  Motor: No psychomotor agitation or retardation  Speech: Normal rhythm, rate, and volume  Mood: "Hopeless"  Affect: Depressed, constricted, congruent to mood  Thought Process: Liner, Goal directed  Thought Content: Passive SI, no HI or active SI/I/P  Perception: No AVH  Cognition:   Insight: Good  Judgement: Good  Impulse Control: Intact on interview

## 2023-06-02 NOTE — BH PATIENT PROFILE - NSPROSPHOSPCHAPLAINYN_GEN_A_NUR
"Subjective:       Patient ID: Aura Lynn is a 43 y.o. female.    Chief Complaint: Obstructive sleep apnea    HPI  Aura Lynn is a 43 y.o. female who presents today for follow up sleep study. She has no significant medical history other than +difficulty going back sleep.  In the past, patient would wake up without difficulty falling back to sleep.  +worsening of snoring per boyfriend.  No witnessed apnea but awoken from sleep a/w snoring. Her sleep study results was + for obstructive sleep apnea. A dedicated CPAP titration study was recommended.     Review of patient's allergies indicates:  No Known Allergies  No past medical history on file.  No past surgical history on file.  Family History     None        Social History Main Topics    Smoking status: Never Smoker    Smokeless tobacco: Never Used    Alcohol use 1.2 oz/week     2 Glasses of wine per week    Drug use: No    Sexual activity: Yes     Partners: Male     Birth control/ protection: Injection       Review of Systems   Constitutional: Negative for fever and chills.   Respiratory: Negative for shortness of breath.    Cardiovascular: Negative for chest pain.   Neurological: Negative for headaches.   Psychiatric/Behavioral: Positive for sleep disturbance.       Objective:       Vitals:    08/02/17 0914   BP: 116/88   Pulse: 64   Temp: 98.4 °F (36.9 °C)   SpO2: 99%   Weight: 103.1 kg (227 lb 4.7 oz)   Height: 6' 1" (1.854 m)     Physical Exam   Constitutional: She is oriented to person, place, and time.   Neck: Normal range of motion.   Cardiovascular: Normal rate, regular rhythm and normal heart sounds.    Pulmonary/Chest: Normal expansion, symmetric chest wall expansion, effort normal and breath sounds normal.   Musculoskeletal: Normal range of motion.   Neurological: She is alert and oriented to person, place, and time.   Skin: Skin is warm and dry.   Psychiatric: She has a normal mood and affect.   Vitals reviewed.    Personal Diagnostic " Review    Sleep study reviewed      Assessment:         Outpatient Encounter Prescriptions as of 8/2/2017   Medication Sig Dispense Refill    doxepin (SINEQUAN) 10 MG capsule Take 1 capsule (10 mg total) by mouth every evening. 30 capsule 2    medroxyPROGESTERone (DEPO-PROVERA) 150 mg/mL injection Inject 150 mg into the muscle every 3 (three) months.       No facility-administered encounter medications on file as of 8/2/2017.      1. Obstructive sleep apnea  CPAP titration (Must have diagnosis of DORINA from previous sleep study.)       Plan:       · Educated patient on etiology of DORINA and the cardiovascular risks involved if left untreated.   · CPAP titration study. Will provided sleep aid such as ambien for night of study.    Follow up after testing has resulted and patient on CPAP therapy for 60-90 days.  Contact prior to if any issues or concerns should arise.   Patient verbalized understanding of all information, instruction, education, recommendations provided and agrees with plan of care.     no

## 2023-06-02 NOTE — BH INPATIENT PSYCHIATRY ASSESSMENT NOTE - NSBHATTESTCOMMENTATTENDFT_PSY_A_CORE
Agree with above.  43 year old man with depressed mood, prominent anhedonia, insomnia, SI, sx worse in AM consistent with melancholia.  Has responded to some treatments for brief times, likely placebo response followed by rapid return in sx.  ECT risks and benefits discussed.  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  Will switch to Effexor and Remeron from Lexapro and continue other home meds for now.  No CO required.

## 2023-06-02 NOTE — DIETITIAN INITIAL EVALUATION ADULT - PERTINENT MEDS FT
MEDICATIONS  (STANDING):  buPROPion XL (24-Hour) 300 milliGRAM(s) Oral daily  escitalopram 15 milliGRAM(s) Oral daily  levothyroxine 25 MICROGram(s) Oral daily  mirtazapine 7.5 milliGRAM(s) Oral at bedtime  venlafaxine XR 37.5 milliGRAM(s) Oral daily    MEDICATIONS  (PRN):  diphenhydrAMINE 50 milliGRAM(s) Oral every 4 hours PRN agitation  diphenhydrAMINE Injectable 50 milliGRAM(s) IntraMuscular once PRN Agitation  haloperidol     Tablet 5 milliGRAM(s) Oral every 4 hours PRN agitation  haloperidol    Injectable 5 milliGRAM(s) IntraMuscular once PRN Agitation  LORazepam     Tablet 2 milliGRAM(s) Oral every 4 hours PRN agitation  LORazepam   Injectable 2 milliGRAM(s) IntraMuscular once PRN Agitation

## 2023-06-02 NOTE — BH INPATIENT PSYCHIATRY ASSESSMENT NOTE - PAST PSYCHOTROPIC MEDICATION
Lexapro - 20 mg  Abilify - 5 mg  Wellbutrin - 400 mg  Prestiq - Unknown Dose Lexapro - 20 mg  Abilify - 5 mg  Wellbutrin - 400 mg  Pristiq - Unknown Dose

## 2023-06-02 NOTE — PSYCHIATRIC REHAB INITIAL EVALUATION - ABILITY TO HEAR (WITH HEARING AID OR HEARING APPLIANCE IF NORMALLY USED):
Adequate: hears normal conversation without difficulty
[FreeTextEntry1] : \par 31 M with hx of spontaneous pneumothorax in the distant past (10+ years ago) s/p VATS presenting for pre-op evaluation for R hip arthroscopy. \par \par # Pre-Op eval\par - No cardiac risk factors and currently without symptoms\par - No chest pain currently, but had an EKG done at last visit in February 2021 which was unremarkable. No indication for repeat EKG at this time.\par - Distant hx of pneumothorax, no recent complications or symptoms \par - Labs ordered: CBC and BMP. F/u labs for renal function, electrolytes, Hgb\par \par Medical optimization pending lab results, please wait for addendum\par \par Discussed with Dr. Wheeler\par \par Kel Vasquez, PGY-1

## 2023-06-02 NOTE — BH INPATIENT PSYCHIATRY ASSESSMENT NOTE - NSICDXPASTMEDICALHX_GEN_ALL_CORE_FT
PAST MEDICAL HISTORY:  Anxiety and depression      PAST MEDICAL HISTORY:  Anxiety and depression     Hypothyroidism

## 2023-06-02 NOTE — ECT CONSULT NOTE - NSECTMENTALSTATUSEXAM_PSY_ALL_CORE
Conscious, cooperative, alert.   Mild psychomotor retardation present.   Good eye contact.   Speech: regular rate and rhythm,   Mood: "Depressed" Affect: appropriate, full range.   Thought Process: linear, goal directed   Thought Content: No Death wish, No Suicidal ideation/intent/plan, No homicidal ideation/intent/plan. No delusions . Egodystonic recurrent intrusive images of unfamiliar people commiting suicide by different means.   Perception: No hallucinations   Insight and Judgement: fair  Impulse Control: fair at this time.

## 2023-06-02 NOTE — ECT CONSULT NOTE - OTHER PAST PSYCHIATRIC HISTORY (INCLUDE DETAILS REGARDING ONSET, COURSE OF ILLNESS, INPATIENT/OUTPATIENT TREATMENT)
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     Patient states that he has been feeling depressed and started on lexapro in July 2022.  States that he has been having worsening depression and anxiety over the past few days.  Denies any acute stressors.  States that lately he has just been feeling more down, unmotivated, and unable to function at work endorsing that he has used up all his sick days. He states that he has been getting worsening passive suicidal thoughts and is worried that he may act on it.  He continues to endorse that he has been feeling distressed and restless.  Expressed frustration with his treatment.  States that he was seeing an NP who started him on lexapro and then wellbutrin and then added olanzepine for sleep which made him more agitated.  States that he was briefly on Abilify from 5mg to 10mg and became restless and was eventually discontinued.  States that he felt momentarily brief effectiveness of treatment.  Recently he sought a second opinion with [facility] who suggested trial with Zoloft.  Reports feeling hopeless and wanting to come to the hospital for help before things get worse    Patient reports depressive symptoms including depressed mood, anhedonia, poor energy/concentration/decreased appetite, with frequent sleep disturbances and middle insomnia. Patient reports symptoms of anxiety including anxious mood, symptoms of panic disorder. He denies social anxiety or social stressors.  Patient denies any psychotic symptoms including paranoia or auditory/visual hallucinations. Patient denies active suicidal/homicidal ideations, intent or plans. States that he just wants to get help.     Per collateral information from wife by the team: "reports worsening anxiety and depression over the past 2 weeks.  States that he was crying today and has been uninhibited by his emotions but states that he was overtly suicidal/homicidal ideations, intent or plan and stated that he wanted to go to the hospital.  Reports that he has a lot of downs.  Reports going to another provider today and wanted a second opinion and feeling hopeless with prognosis and treatment options.  Denies acute stressors."    During interview on the unit: Patient confirmed the above. He feels that he was having fatigue and exhaustion for about 5 years. In July 2022, he had suicidal ideation out of blue and saw an image of himself hanging. It was there for few seconds and then he got anxious with perspiration and went to emergency room. Since then, he has seen different psychiatric providers for depression and anxiety. He had some relief in his symptoms for few weeks to less than 2 months, but not complete remission of symptoms and then gradually started to have worsening of symptoms. He currently rates his depression as 9/10 with 10 being the worst depression. It is worse in the morning and is associated with middle and terminal insomnia. He used to love playing Guitar, listening to music and playing basketball. He no longer enjoys those activities and has severe anhedonia and amotivation. He has crying episodes everyday. Daily chores seem too much of a task to him. He has difficulty in concentration. Appetite is low. He denies death wish or suicidal ideation or homicidal ideation/intent/plan. He however experiences recurrent intrusive images of other people killing themselves with different methods. He finds these images stressful and identifies them as egodystonic. Denies any other OCD symptoms. He denies kate/hypomania or psychosis symptoms.  Depression is associated with severe anxiety and occasional panic attacks.     Past Psychiatric History: no previous h/o psychiatric hospitalization or suicide attempt. No prior psychaitric history apart from above.     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 No access to Gun. Drinks Etoh socially. No tobacco use or recreational drug use.

## 2023-06-02 NOTE — DIETITIAN INITIAL EVALUATION ADULT - PERTINENT LABORATORY DATA
06-01    137  |  100  |  14  ----------------------------<  109<H>  3.9   |  25  |  0.88    Ca    10.0      01 Jun 2023 21:25    TPro  7.0  /  Alb  5.2<H>  /  TBili  0.5  /  DBili  x   /  AST  21  /  ALT  15  /  AlkPhos  64  06-01

## 2023-06-02 NOTE — ECT CONSULT NOTE - NSECTASSESSRECOMM_PSY_ALL_CORE
A course of ECT is indicated to target the severe depressive symptoms that Mr. Hernandez is experiencing.     More than 1 hr was spent in counselling and/or coordination of care, including but not limited to discussing with patient risk and benefits of ECT, including but not limited to memory side-effects, ECT induced kate/hypomania, various ECT placements, the usual trajectory of response with acute course of ECT, discussing  informed consent for ECT.     In view of the MRI finding of gliosis in the left frontal lobe (which is not a contraindication for ECT), recommended to start with Right Unilateral placement. Risk vs benefits discussed and consent was given to Mr. Hernandez. Mr. Hernandez wants to think more about it and make a decision later.

## 2023-06-02 NOTE — BH INPATIENT PSYCHIATRY ASSESSMENT NOTE - OTHER PAST PSYCHIATRIC HISTORY (INCLUDE DETAILS REGARDING ONSET, COURSE OF ILLNESS, INPATIENT/OUTPATIENT TREATMENT)
No history of hospitalizations, suicide attempts, self injurious behaviors No history of hospitalizations, suicide attempts, self injurious behaviors  For details, see HPI

## 2023-06-02 NOTE — BH INPATIENT PSYCHIATRY ASSESSMENT NOTE - NSDCCRITERIA_PSY_ALL_CORE
When pt is no longer an acute or imminent risk of harm to self, and is able to care for self safely, pt may then be discharged.

## 2023-06-02 NOTE — BH INPATIENT PSYCHIATRY ASSESSMENT NOTE - HPI (INCLUDE ILLNESS QUALITY, SEVERITY, DURATION, TIMING, CONTEXT, MODIFYING FACTORS, ASSOCIATED SIGNS AND SYMPTOMS)
Dante Hernandez is a 42 y/o man, domiciled with his wife and two sons, with a history of depression since July 2022 treated with Lexapro, Wellbutrin, Abilify, and Prestiq, and a TBI in 2010, admitted on a voluntary status for progressive worsening of his depression and passive suicidal ideation. Mr. Hernandez first sought medical attention for his depression in July of 2022 when he suffered from depressed mood and a panic attack. He said he was unaware of any precipitating factors that would have caused this decline in mood. He went to the [facility] ED where he met with a psychiatrist and he was discharged with Lexapro 10 mg. He met with the [facility] Crisis Team where he was connected to [facility] with telepsychiatry appointments. After a few weeks he was told he needed to go in person, so he sought a psychiatrist closer to home through [facility]. Over the past 10 months he has seen a few clincians through [facility], most recently CHACHO Bauer. Over the past year his Lexapro was increased to 20mg and he has also been on Abilfy 5 mg and Wellbutrin 400mg. He was briefly on Prestiq with an unknown dose. After medications are changed or doses are modified, he has experienced transient relief from medications but no durable remission of symptoms. Recently his psychiatric RN believed that his symptoms were due to insufficient sleep and he underwent a sleep study at [facility], results pending. In the past week he describes a worsening of depressive symptoms including thoughts of not wanting to live, leading him to come to [facility] and voluntarily admit himself.     Mr. Hernandez says that he feels depressed and he is unable to get shiraz from activities he used to enjoy. He has no confidence in himself, and he has been avoiding spending time with friends due to his symptoms. He does not get excited by things anymore. He fears that nothing will allow him to feel better and says he is losing hope.  Dante Hernandez is a 44 y/o man, domiciled with his wife and two sons, with a history of depression since July 2022 treated with Lexapro, Wellbutrin, Abilify, and Prestiq, a TBI in 2010, and hypothyroidism, admitted on a voluntary status for progressive worsening of his depression and passive suicidal ideation. Mr. Hernandez first sought medical attention for his depression in July of 2022 when he suffered from depressed mood and a panic attack. He said he was unaware of any precipitating factors that would have caused this decline in mood. He went to the [facility] ED where he met with a psychiatrist and he was discharged with Lexapro 10 mg. He met with the [facility] Crisis Team where he was connected to [facility] with telepsychiatry appointments. After a few weeks he was told he needed to go in person, so he sought a psychiatrist closer to home through [facility]. Over the past 10 months he has seen a few clincians through [facility], most recently CHACHO Bauer. Over the past year his Lexapro was increased to 20mg and he has also been on Abilfy 5 mg and Wellbutrin 400mg. He was briefly on Prestiq with an unknown dose. After medications are changed or doses are modified, he has experienced transient relief from medications but no durable remission of symptoms. Recently his psychiatric RN believed that his symptoms were due to insufficient sleep and he underwent a sleep study at [facility], results pending. In the past week he describes a worsening of depressive symptoms including thoughts of not wanting to live, leading him to come to [facility] and voluntarily admit himself.     Mr. Hernandez says that he feels depressed and he is unable to get shiraz from activities he used to enjoy. He has no confidence in himself, and he has been avoiding spending time with friends due to his symptoms. He does not get excited by things anymore. He fears that nothing will allow him to feel better and says he is losing hope.  Dante Hernandez is a 44 y/o man, domiciled with his wife and two sons, with a history of depression since July 2022 treated with Lexapro, Wellbutrin, Abilify, and Prestiq, a TBI in 2010, and hypothyroidism, admitted on a voluntary status for progressive worsening of his depression and passive suicidal ideation. Mr. Hernandez first sought medical attention for his depression in July of 2022 when he suffered from depressed mood and a panic attack. He said he was unaware of any precipitating factors that would have caused this decline in mood. He went to the [facility] ED where he met with a psychiatrist and he was discharged with Lexapro 10 mg. He met with the [facility] Crisis Team where he was connected to [facility] with telepsychiatry appointments. After a few weeks he was told he needed to go in person, so he sought a psychiatrist closer to home through [facility]. Over the past 10 months he has seen a few clincians through [facility], most recently CHACHO Bauer. Over the past year his Lexapro was increased to 20mg and he has also been on Abilfy 5 mg and Wellbutrin 400mg. He was briefly on Pristiq with an unknown dose. After medications are changed or doses are modified, he has experienced transient relief from medications but no durable remission of symptoms. Recently his psychiatric RN believed that his symptoms were due to insufficient sleep and he underwent a sleep study at [facility], results pending. In the past week he describes a worsening of depressive symptoms including thoughts of not wanting to live, leading him to come to [facility] and voluntarily admit himself.     Mr. Hernandez says that he feels depressed and he is unable to get shiraz from activities he used to enjoy. He has no confidence in himself, and he has been avoiding spending time with friends due to his symptoms. He does not get excited by things anymore. He fears that nothing will allow him to feel better and says he is losing hope.

## 2023-06-02 NOTE — ECT CONSULT NOTE - DESCRIPTION
Hypothyroidism, h/o TBI with LOC while playing basket ball in 2010. (fracture skull and left frontal hemorrhage)

## 2023-06-03 PROCEDURE — 99232 SBSQ HOSP IP/OBS MODERATE 35: CPT

## 2023-06-03 RX ADMIN — Medication 37.5 MILLIGRAM(S): at 08:45

## 2023-06-03 RX ADMIN — Medication 25 MICROGRAM(S): at 06:31

## 2023-06-03 RX ADMIN — BUPROPION HYDROCHLORIDE 300 MILLIGRAM(S): 150 TABLET, EXTENDED RELEASE ORAL at 08:45

## 2023-06-03 RX ADMIN — ESCITALOPRAM OXALATE 15 MILLIGRAM(S): 10 TABLET, FILM COATED ORAL at 08:45

## 2023-06-03 RX ADMIN — MIRTAZAPINE 7.5 MILLIGRAM(S): 45 TABLET, ORALLY DISINTEGRATING ORAL at 20:40

## 2023-06-03 NOTE — BH INPATIENT PSYCHIATRY PROGRESS NOTE - NSBHFUPINTERVALHXFT_PSY_A_CORE
Patient seen for follow up of Depression  Chart reviewed and case discussed with nursing staff  Patient remains severely depressed and hopeless with anhedonia and poor appetite forcing himself to eat  Anxious about starting ECT and worried about possible effects on his memory

## 2023-06-03 NOTE — BH INPATIENT PSYCHIATRY PROGRESS NOTE - MSE UNSTRUCTURED FT
On exam today the patient is generally cooperative with fair eye contact.    Speech is clear and of normal rate.    Thought process: with no evidence of a disorder of thought process.    Thought content: with no evidence of psychotic beliefs.  Perception: Denies hallucinations.    Mood: Describes as "depressed ".  Affect: constricted.    Patient hopeless but denies active suicidal ideation, intent and plan.    Patient denies active aggressive/homicidal ideation, intent or plan.   Patient is Alert and oriented in all spheres. Patient is cognitively grossly intact.   Insight and judgment are limited. Impulse control is intact at this time

## 2023-06-03 NOTE — BH INPATIENT PSYCHIATRY PROGRESS NOTE - NSBHASSESSSUMMFT_PSY_ALL_CORE
Dante Hernandez is a 42 y/o male, domiciled with his wife and 2 sons, with a history of treatment for depression since July 2022  with Lexapro, Wellbutrin, Abilify, and Pristiq, a TBI in 2010, and hypothyroidism, presenting with worsening symptoms of depression, loss of hope, and recurrent suicidal ideation. 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     6/3 update  Patient remains depressed and hopeless  Denies active SI  Hesitant about ECT      Plan:  1. Legals: 9.13 status  2. Safety: routine observation, denies SI/HI/I/P on the unit. Haldol 5 mg/Ativan 2 mg/Benadryl 50 mg PO q6h/IM PRN for agitation  3. Psychiatric:   	c/w buproprion XL (24-Hour) 300 mg po qd  	c/w escitalopram 15 mg po qD, taper down slowly  	mirtazapine 7.5 mg po qHS  	venlafaxine XR 37.5 mg po qD  	ECT   4. Group, milieu, individual therapy as appropriate.  5. Medical: Hypothyroidism, Levothyroxine 25 mcg, po qD  	Should obtain thyroid level  6. Dispo: pending clinical improvement.  Patient requires inpatient hospitalization for stabilization and safety.

## 2023-06-04 PROCEDURE — 99231 SBSQ HOSP IP/OBS SF/LOW 25: CPT

## 2023-06-04 RX ORDER — ESCITALOPRAM OXALATE 10 MG/1
10 TABLET, FILM COATED ORAL DAILY
Refills: 0 | Status: DISCONTINUED | OUTPATIENT
Start: 2023-06-05 | End: 2023-06-09

## 2023-06-04 RX ORDER — BUPROPION HYDROCHLORIDE 150 MG/1
150 TABLET, EXTENDED RELEASE ORAL DAILY
Refills: 0 | Status: DISCONTINUED | OUTPATIENT
Start: 2023-06-05 | End: 2023-06-14

## 2023-06-04 RX ORDER — VENLAFAXINE HCL 75 MG
75 CAPSULE, EXT RELEASE 24 HR ORAL DAILY
Refills: 0 | Status: DISCONTINUED | OUTPATIENT
Start: 2023-06-05 | End: 2023-06-06

## 2023-06-04 RX ADMIN — MIRTAZAPINE 7.5 MILLIGRAM(S): 45 TABLET, ORALLY DISINTEGRATING ORAL at 20:16

## 2023-06-04 RX ADMIN — Medication 37.5 MILLIGRAM(S): at 08:17

## 2023-06-04 RX ADMIN — Medication 25 MICROGRAM(S): at 06:15

## 2023-06-04 RX ADMIN — BUPROPION HYDROCHLORIDE 300 MILLIGRAM(S): 150 TABLET, EXTENDED RELEASE ORAL at 08:17

## 2023-06-04 RX ADMIN — ESCITALOPRAM OXALATE 15 MILLIGRAM(S): 10 TABLET, FILM COATED ORAL at 08:17

## 2023-06-04 NOTE — BH INPATIENT PSYCHIATRY PROGRESS NOTE - NSBHFUPINTERVALHXFT_PSY_A_CORE
Patient seen for follow up of Depression  Chart reviewed and case discussed with nursing staff  Patient remains severely depressed but less hopeless and better appetite   Anxious about starting ECT and worried about possible effects on his memory

## 2023-06-04 NOTE — BH INPATIENT PSYCHIATRY PROGRESS NOTE - MSE UNSTRUCTURED FT
On exam today the patient is anxious but cooperative with fair eye contact.    Speech is clear and of normal rate.    Thought process: with no evidence of a disorder of thought process.    Thought content: with no evidence of psychotic beliefs.  Perception: Denies hallucinations.    Mood: Describes as "depressed ".  Affect: constricted.    Patient hopeless but denies active suicidal ideation, intent and plan.    Patient denies active aggressive/homicidal ideation, intent or plan.   Patient is Alert and oriented in all spheres. Patient is cognitively grossly intact.   Insight and judgment are limited. Impulse control is intact at this time

## 2023-06-04 NOTE — BH INPATIENT PSYCHIATRY PROGRESS NOTE - CURRENT MEDICATION
MEDICATIONS  (STANDING):  levothyroxine 25 MICROGram(s) Oral daily  mirtazapine 7.5 milliGRAM(s) Oral at bedtime    MEDICATIONS  (PRN):  diphenhydrAMINE 50 milliGRAM(s) Oral every 4 hours PRN agitation  diphenhydrAMINE Injectable 50 milliGRAM(s) IntraMuscular once PRN Agitation  haloperidol     Tablet 5 milliGRAM(s) Oral every 4 hours PRN agitation  haloperidol    Injectable 5 milliGRAM(s) IntraMuscular once PRN Agitation  LORazepam     Tablet 2 milliGRAM(s) Oral every 4 hours PRN agitation  LORazepam   Injectable 2 milliGRAM(s) IntraMuscular once PRN Agitation

## 2023-06-04 NOTE — BH INPATIENT PSYCHIATRY PROGRESS NOTE - NSBHASSESSSUMMFT_PSY_ALL_CORE
Dante Hernandez is a 44 y/o male, domiciled with his wife and 2 sons, with a history of treatment for depression since July 2022  with Lexapro, Wellbutrin, Abilify, and Pristiq, a TBI in 2010, and hypothyroidism, presenting with worsening symptoms of depression, loss of hope, and recurrent suicidal ideation. 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     6/4 update  Patient remains depressed but less hopeless  Denies active SI  Hesitant about ECT and wants to speak with Dr Silverman in the morning before treatment       Plan:  1. Legals: 9.13 status  2. Safety: routine observation, denies SI/HI/I/P on the unit. Haldol 5 mg/Ativan 2 mg/Benadryl 50 mg PO q6h/IM PRN for agitation  3. Psychiatric:   	decrease buproprion XL (24-Hour) to 150 mg po qd  	decrease escitalopram to 10 mg po qD, taper down slowly  	mirtazapine 7.5 mg po qHS  	Increase venlafaxine XR to 75  mg po qD  	ECT # 1 6/5  4. Group, milieu, individual therapy as appropriate.  5. Medical: Hypothyroidism, Levothyroxine 25 mcg, po qD  	Should obtain thyroid level  6. Dispo: pending clinical improvement.  Patient requires inpatient hospitalization for stabilization and safety.

## 2023-06-05 PROCEDURE — 99232 SBSQ HOSP IP/OBS MODERATE 35: CPT

## 2023-06-05 RX ORDER — MIRTAZAPINE 45 MG/1
15 TABLET, ORALLY DISINTEGRATING ORAL AT BEDTIME
Refills: 0 | Status: DISCONTINUED | OUTPATIENT
Start: 2023-06-05 | End: 2023-06-07

## 2023-06-05 RX ADMIN — MIRTAZAPINE 15 MILLIGRAM(S): 45 TABLET, ORALLY DISINTEGRATING ORAL at 20:17

## 2023-06-05 RX ADMIN — BUPROPION HYDROCHLORIDE 150 MILLIGRAM(S): 150 TABLET, EXTENDED RELEASE ORAL at 08:39

## 2023-06-05 RX ADMIN — Medication 75 MILLIGRAM(S): at 08:38

## 2023-06-05 RX ADMIN — ESCITALOPRAM OXALATE 10 MILLIGRAM(S): 10 TABLET, FILM COATED ORAL at 08:39

## 2023-06-05 RX ADMIN — Medication 25 MICROGRAM(S): at 06:34

## 2023-06-05 NOTE — BH INPATIENT PSYCHIATRY PROGRESS NOTE - CURRENT MEDICATION
MEDICATIONS  (STANDING):  buPROPion XL (24-Hour) 150 milliGRAM(s) Oral daily  escitalopram 10 milliGRAM(s) Oral daily  levothyroxine 25 MICROGram(s) Oral daily  mirtazapine 7.5 milliGRAM(s) Oral at bedtime  venlafaxine XR 75 milliGRAM(s) Oral daily    MEDICATIONS  (PRN):  diphenhydrAMINE 50 milliGRAM(s) Oral every 4 hours PRN agitation  diphenhydrAMINE Injectable 50 milliGRAM(s) IntraMuscular once PRN Agitation  haloperidol     Tablet 5 milliGRAM(s) Oral every 4 hours PRN agitation  haloperidol    Injectable 5 milliGRAM(s) IntraMuscular once PRN Agitation  LORazepam     Tablet 2 milliGRAM(s) Oral every 4 hours PRN agitation  LORazepam   Injectable 2 milliGRAM(s) IntraMuscular once PRN Agitation   MEDICATIONS  (STANDING):  buPROPion XL (24-Hour) 150 milliGRAM(s) Oral daily  escitalopram 10 milliGRAM(s) Oral daily  levothyroxine 25 MICROGram(s) Oral daily  mirtazapine 15 milliGRAM(s) Oral at bedtime  venlafaxine XR 75 milliGRAM(s) Oral daily    MEDICATIONS  (PRN):  diphenhydrAMINE 50 milliGRAM(s) Oral every 4 hours PRN agitation  diphenhydrAMINE Injectable 50 milliGRAM(s) IntraMuscular once PRN Agitation  haloperidol     Tablet 5 milliGRAM(s) Oral every 4 hours PRN agitation  haloperidol    Injectable 5 milliGRAM(s) IntraMuscular once PRN Agitation  LORazepam     Tablet 2 milliGRAM(s) Oral every 4 hours PRN agitation  LORazepam   Injectable 2 milliGRAM(s) IntraMuscular once PRN Agitation

## 2023-06-05 NOTE — BH INPATIENT PSYCHIATRY PROGRESS NOTE - NSBHASSESSSUMMFT_PSY_ALL_CORE
Dante Hernandez is a 44 y/o male, domiciled with his wife and 2 sons, with a history of treatment for depression since July 2022  with Lexapro, Wellbutrin, Abilify, and Pristiq, a TBI in 2010, and hypothyroidism, presenting with worsening symptoms of depression, loss of hope, and recurrent suicidal ideation. 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     6/4 update  Patient remains depressed but less hopeless  Denies active SI  Hesitant about ECT and wants to speak with Dr Silverman in the morning before treatment       Plan:  1. Legals: 9.13 status  2. Safety: routine observation, denies SI/HI/I/P on the unit. Haldol 5 mg/Ativan 2 mg/Benadryl 50 mg PO q6h/IM PRN for agitation  3. Psychiatric:   	decrease buproprion XL (24-Hour) to 150 mg po qd  	decrease escitalopram to 10 mg po qD, taper down slowly  	mirtazapine 7.5 mg po qHS  	Increase venlafaxine XR to 75  mg po qD  	ECT # 1 6/5  4. Group, milieu, individual therapy as appropriate.  5. Medical: Hypothyroidism, Levothyroxine 25 mcg, po qD  	Should obtain thyroid level  6. Dispo: pending clinical improvement.  Patient requires inpatient hospitalization for stabilization and safety. Dante Hernandez is a 42 y/o male, domiciled with his wife and 2 sons, with a history of treatment for depression since July 2022  with Lexapro, Wellbutrin, Abilify, and Pristiq, a TBI in 2010, and hypothyroidism, presenting with worsening symptoms of depression, loss of hope, and recurrent suicidal ideation. 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      Plan:  1. Legals: 9.13 status  2. Safety: routine observation, denies SI/HI/I/P on the unit. Haldol 5 mg/Ativan 2 mg/Benadryl 50 mg PO q6h/IM PRN for agitation  3. Psychiatric:   	decrease buproprion XL (24-Hour) to 150 mg po qd  	decrease escitalopram to 10 mg po qD, taper down slowly  	mirtazapine 7.5 mg po qHS  	Increase venlafaxine XR to 75  mg po qD  	ECT # 1 6/5  4. Group, milieu, individual therapy as appropriate.  5. Medical: Hypothyroidism, Levothyroxine 25 mcg, po qD  	Should obtain thyroid level  6. Dispo: pending clinical improvement.  Patient requires inpatient hospitalization for stabilization and safety.

## 2023-06-05 NOTE — BH INPATIENT PSYCHIATRY PROGRESS NOTE - NSBHFUPINTERVALHXFT_PSY_A_CORE
Patient seen for follow up of Depression  Chart reviewed and case discussed with nursing staff  Patient remains severely depressed but less hopeless and better appetite   Anxious about starting ECT and worried about possible effects on his memory  Patient seen for follow up of Depression  Chart reviewed and case discussed with nursing staff  Patient remains severely depressed but less hopeless and better appetite   Anxious about starting ECT and worried about possible effects on his memory.  Decides not to have ECT today.  Patient seen for follow up of Depression  Chart reviewed and case discussed with nursing staff  Patient remains severely depressed but less hopeless and better appetite.  States mornings have been easier for him and he has not been experiencing SI over the interval. Anxious about starting ECT and worried about possible effects on his memory.  Decides not to have ECT today.

## 2023-06-06 RX ORDER — VENLAFAXINE HCL 75 MG
150 CAPSULE, EXT RELEASE 24 HR ORAL DAILY
Refills: 0 | Status: DISCONTINUED | OUTPATIENT
Start: 2023-06-06 | End: 2023-06-08

## 2023-06-06 RX ADMIN — Medication 75 MILLIGRAM(S): at 08:44

## 2023-06-06 RX ADMIN — MIRTAZAPINE 15 MILLIGRAM(S): 45 TABLET, ORALLY DISINTEGRATING ORAL at 20:43

## 2023-06-06 RX ADMIN — BUPROPION HYDROCHLORIDE 150 MILLIGRAM(S): 150 TABLET, EXTENDED RELEASE ORAL at 08:44

## 2023-06-06 RX ADMIN — ESCITALOPRAM OXALATE 10 MILLIGRAM(S): 10 TABLET, FILM COATED ORAL at 08:44

## 2023-06-06 RX ADMIN — Medication 25 MICROGRAM(S): at 05:42

## 2023-06-06 NOTE — BH INPATIENT PSYCHIATRY PROGRESS NOTE - NSBHFUPINTERVALCCFT_PSY_A_CORE
"I am having reservations about ECT and feel that my sleep and appetite are improving" "I am still undecided about ECT"

## 2023-06-06 NOTE — BH INPATIENT PSYCHIATRY PROGRESS NOTE - NSBHASSESSSUMMFT_PSY_ALL_CORE
Dante Hernandez is a 42 y/o male, domiciled with his wife and 2 sons, with a history of treatment for depression since July 2022  with Lexapro, Wellbutrin, Abilify, and Pristiq, a TBI in 2010, and hypothyroidism, presenting with worsening symptoms of depression, loss of hope, and recurrent suicidal ideation. 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    Today, Dante is still feeling depressed and is deciding to not proceed with ECT at this time. He will continue with venlafaxine and mirtazapine.      Plan:  1. Legals: 9.13 status  2. Safety: routine observation, denies SI/HI/I/P on the unit. Haldol 5 mg/Ativan 2 mg/Benadryl 50 mg PO q6h/IM PRN for agitation  3. Psychiatric:   	decrease buproprion XL (24-Hour) to 150 mg po qd  	decrease escitalopram to 10 mg po qD, taper down slowly  	mirtazapine 7.5 mg po qHS  	Increase venlafaxine XR to 75  mg po qD  4. Group, milieu, individual therapy as appropriate.  5. Medical: Hypothyroidism, Levothyroxine 25 mcg, po qD  	Should obtain thyroid level  6. Dispo: pending clinical improvement.  Patient requires inpatient hospitalization for stabilization and safety.

## 2023-06-06 NOTE — BH INPATIENT PSYCHIATRY PROGRESS NOTE - CURRENT MEDICATION
MEDICATIONS  (STANDING):  buPROPion XL (24-Hour) 150 milliGRAM(s) Oral daily  escitalopram 10 milliGRAM(s) Oral daily  levothyroxine 25 MICROGram(s) Oral daily  mirtazapine 15 milliGRAM(s) Oral at bedtime  venlafaxine XR 75 milliGRAM(s) Oral daily    MEDICATIONS  (PRN):  diphenhydrAMINE 50 milliGRAM(s) Oral every 4 hours PRN agitation  diphenhydrAMINE Injectable 50 milliGRAM(s) IntraMuscular once PRN Agitation  haloperidol     Tablet 5 milliGRAM(s) Oral every 4 hours PRN agitation  haloperidol    Injectable 5 milliGRAM(s) IntraMuscular once PRN Agitation  LORazepam     Tablet 2 milliGRAM(s) Oral every 4 hours PRN agitation  LORazepam   Injectable 2 milliGRAM(s) IntraMuscular once PRN Agitation

## 2023-06-06 NOTE — BH INPATIENT PSYCHIATRY PROGRESS NOTE - NSBHFUPINTERVALHXFT_PSY_A_CORE
Patient seen for follow up of Depression  Chart reviewed and case discussed with nursing staff  Patient remains severely depressed but less hopeless and better appetite.  States mornings have been easier for him and he has not been experiencing SI over the interval. Anxious about starting ECT and worried about possible effects on his memory.  Decides not to have ECT today.  Patient seen for follow up of Depression  Chart reviewed. Case discussed with interdisciplinary team. No prns given. No acute events overnight    Patient remains severely depressed but less hopeless and better appetite.  States he has not been experiencing SI. Yesterday he called family members and his  and spent time reading and praying in his room. He socialized with a few people on the unit. His cousin came and he felt exhausted talking to her. He finds interactions with others very tiring and he speculates that this may be partially due to sharing the same things about this treatment with every person he talks to. Dante remained undecided about ECT and is worried about the effects on his memory and durability of remission that can be achieved using ECT vs medicine alone. After discussion with team, he will not be pursuing ECT for the time being and will see how he improves on the combination of Venlafaxine and Mirtazapine.

## 2023-06-07 PROCEDURE — 90832 PSYTX W PT 30 MINUTES: CPT

## 2023-06-07 RX ORDER — MIRTAZAPINE 45 MG/1
22.5 TABLET, ORALLY DISINTEGRATING ORAL AT BEDTIME
Refills: 0 | Status: DISCONTINUED | OUTPATIENT
Start: 2023-06-07 | End: 2023-06-09

## 2023-06-07 RX ADMIN — MIRTAZAPINE 22.5 MILLIGRAM(S): 45 TABLET, ORALLY DISINTEGRATING ORAL at 20:36

## 2023-06-07 RX ADMIN — Medication 25 MICROGRAM(S): at 06:06

## 2023-06-07 RX ADMIN — ESCITALOPRAM OXALATE 10 MILLIGRAM(S): 10 TABLET, FILM COATED ORAL at 08:08

## 2023-06-07 RX ADMIN — BUPROPION HYDROCHLORIDE 150 MILLIGRAM(S): 150 TABLET, EXTENDED RELEASE ORAL at 08:08

## 2023-06-07 RX ADMIN — Medication 150 MILLIGRAM(S): at 08:08

## 2023-06-07 NOTE — BH INPATIENT PSYCHIATRY PROGRESS NOTE - NSBHFUPINTERVALHXFT_PSY_A_CORE
Pt reports right leg pain x1 month, has been taking ibuprofen and tylenol intermittently.  Denies injury Patient seen for follow up of Depression  Chart reviewed. Case discussed with interdisciplinary team. No prns given. No acute events overnight    Patient remains severely depressed but he is feeling better than he did last Friday. Dante describes Friday as a low point and a really bad day but he thinks he has been improving since then. He is feeling more hope then he did last week. He states he has not been experiencing SI since Friday. Yesterday he spent most of the day on his own reading or meditating in his room. He has not gone to any group sessions and the team encouraged him to participate.     Mr. Hernandez spent some time discussing his earlier life. He has lived his whole life in [place]. He went through the [employer] public school system before going to [employer]. He started a masters at [email or web address] to be a [employer]  but dropped out because he realized he did not want to pursue that career path. 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     Mr. Hernandez met his wife through [employer] and he has been involved in this non-Hindu Baptism for much of his life. He says his engagement in activities has dropped since he started feeling depressed. Aside from Bapt[employer], he used to be physically active and participate more in sports such as basketball and tennis but he feels that as he is getting older, his body is breaking down more often and he has avoided intense physical activity. He is very involved in the life of his two sons. Because he works at school, he is home by 4 PM in time for his son to be dropped off from school and he enjoys spending time with them.    Writer spoke with Vivi Gottlieb, the patient's therapist ([phone]).  Patient first connected with [facility] for outpatient treatment in september. He has been adherent to medication regimens, however his depression has not responded well, having old brief periods of relief. He had one psychiatric NP early in his care but she went out on leave and the new NP modified his regimen. He is very motivated to get well and interested in his medical treatment but he has been resistant to psychotherapy. He meets with Vivi once a month for brief check-ins and has resisted going to twice a month or weekly appointments.

## 2023-06-07 NOTE — BH PSYCHOLOGY - CLINICIAN PSYCHOTHERAPY NOTE - NSBHPSYCHOLNARRATIVE_PSY_A_CORE FT
Patient presented calm, cooperative, and more engaged as the session progressed. Hygiene and grooming were good. Mood was "better" and affect sad and constricted. Thought process was linear but concrete, and goal-directed. Patient presented with limitation to his psychological mindedness. Speech was clear, coherent, and of normal pace and volume. No perceptual disturbances were reported or observed. He denied active suicidal ideation, plan or intent. Insight and judgment were limited.      Patient and writer met for a 30-minute introductory therapy session. Patient highlighted "fatigue" has his most prominent, long-standing depressive symptom, with "suicidal thoughts" as the most recent and concerning prior to admission. Patient was unable to connect the onset of his depressive episodes to any internal or external triggers. He appeared confused with the idea that he would want to kill himself when he identified his wife, sons, and career as strong protective factors. He discussed his history of mental health treatment, specifically psychopharmacology, med changes, and their various effectiveness. 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 He discussed the operations of his job, the population he works with, and changes to his job through the pandemic and post. He again appeared unaware of the impact his job might have had on his psychological and emotional functioning. He described working more hours to cover other employees who were out and also filling in due to staff shortages. In speaking, patient was able to acknowledge that his work became less rewarding and fulfilling gradually over time. Writer provided psychoeducation on how this could have impacted his mood states with fair effect. Writer also encouraged patient to prioritize his self-care in his schedule, as it is often the first this removed from out schedule when we get busy. Patient reported that he has had a string of five days of improved mood. He expressed feeling hopeful for continued improvement.  Patient presented calm, cooperative, and more engaged as the session progressed. Hygiene and grooming were good. Mood was "better" and affect sad and constricted. Thought process was linear but concrete, and goal-directed. Patient presented with limitation to his psychological mindedness. Speech was clear, coherent, and of normal pace and volume. No perceptual disturbances were reported or observed. He denied active suicidal ideation, plan or intent. Insight and judgment were limited.      Patient and writer met for a 30-minute introductory therapy session. Patient highlighted "fatigue" has his most prominent, long-standing depressive symptom, with "suicidal thoughts" as the most recent and concerning prior to admission. Patient was unable to connect the onset of his depressive episodes to any internal or external triggers. He appeared confused with the idea that he would want to kill himself when he identified his wife, sons, and career as strong protective factors. He discussed his history of mental health treatment, specifically psychopharmacology, med changes, and their various effectiveness. 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 He discussed the operations of his job, the population he works with, and changes to his job through the pandemic and post. He again appeared unaware of the impact his job might have had on his psychological and emotional functioning. He described working more hours to cover other employees who were out and also filling in due to staff shortages. In speaking, patient was able to acknowledge that his work became less rewarding and fulfilling gradually over time. Writer provided psychoeducation on how this could have impacted his mood states with fair effect. Writer also encouraged patient to prioritize his self-care in his schedule, as it is often the first thing removed from our schedule when we get busy. Patient reported that he has had a string of five days of improved mood. He expressed feeling hopeful for continued improvement.

## 2023-06-07 NOTE — BH INPATIENT PSYCHIATRY PROGRESS NOTE - CURRENT MEDICATION
MEDICATIONS  (STANDING):  buPROPion XL (24-Hour) 150 milliGRAM(s) Oral daily  escitalopram 10 milliGRAM(s) Oral daily  levothyroxine 25 MICROGram(s) Oral daily  mirtazapine 15 milliGRAM(s) Oral at bedtime  venlafaxine  milliGRAM(s) Oral daily    MEDICATIONS  (PRN):  diphenhydrAMINE 50 milliGRAM(s) Oral every 4 hours PRN agitation  diphenhydrAMINE Injectable 50 milliGRAM(s) IntraMuscular once PRN Agitation  haloperidol     Tablet 5 milliGRAM(s) Oral every 4 hours PRN agitation  haloperidol    Injectable 5 milliGRAM(s) IntraMuscular once PRN Agitation  LORazepam     Tablet 2 milliGRAM(s) Oral every 4 hours PRN agitation  LORazepam   Injectable 2 milliGRAM(s) IntraMuscular once PRN Agitation

## 2023-06-07 NOTE — BH INPATIENT PSYCHIATRY PROGRESS NOTE - NSBHASSESSSUMMFT_PSY_ALL_CORE
Dante Hernandez is a 44 y/o male, domiciled with his wife and 2 sons, with a history of treatment for depression since July 2022  with Lexapro, Wellbutrin, Abilify, and Pristiq, a TBI in 2010, and hypothyroidism, presenting with worsening symptoms of depression, loss of hope, and recurrent suicidal ideation. 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    Today, Dante is still feeling a little bit better but is still depressed. He will continue increasing his doses of venlafaxine and mirtazapine.      Plan:  1. Legals: 9.13 status  2. Safety: routine observation, denies SI/HI/I/P on the unit. Haldol 5 mg/Ativan 2 mg/Benadryl 50 mg PO q6h/IM PRN for agitation  3. Psychiatric:   	decrease buproprion XL (24-Hour) to 150 mg po qd  	decrease escitalopram to 10 mg po qD, taper down slowly  	mirtazapine 7.5 mg po qHS  	Increase venlafaxine XR to 75  mg po qD  4. Group, milieu, individual therapy as appropriate.  5. Medical: Hypothyroidism, Levothyroxine 25 mcg, po qD  	Should obtain thyroid level  6. Dispo: pending clinical improvement.  Patient requires inpatient hospitalization for stabilization and safety. Dante Hernandez is a 42 y/o male, domiciled with his wife and 2 sons, with a history of treatment for depression since July 2022  with Lexapro, Wellbutrin, Abilify, and Pristiq, a TBI in 2010, and hypothyroidism, presenting with worsening symptoms of depression, loss of hope, and recurrent suicidal ideation. 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    Today, Dante is still feeling a little bit better but is still depressed. He will continue increasing his doses of venlafaxine and mirtazapine.      Plan:  1. Legals: 9.13 status  2. Safety: routine observation, denies SI/HI/I/P on the unit. Haldol 5 mg/Ativan 2 mg/Benadryl 50 mg PO q6h/IM PRN for agitation  3. Psychiatric:   	decrease buproprion XL (24-Hour) to 150 mg po qd  	decrease escitalopram to 10 mg po qD, taper down slowly  	mirtazapine to 22.5 mg po qHS  	Increased venlafaxine XR to 150  mg po qD  4. Group, milieu, individual therapy as appropriate.  5. Medical: Hypothyroidism, Levothyroxine 25 mcg, po qD  	Should obtain thyroid level  6. Dispo: pending clinical improvement.  Patient requires inpatient hospitalization for stabilization and safety.

## 2023-06-08 RX ORDER — VENLAFAXINE HCL 75 MG
225 CAPSULE, EXT RELEASE 24 HR ORAL DAILY
Refills: 0 | Status: DISCONTINUED | OUTPATIENT
Start: 2023-06-08 | End: 2023-06-16

## 2023-06-08 RX ADMIN — BUPROPION HYDROCHLORIDE 150 MILLIGRAM(S): 150 TABLET, EXTENDED RELEASE ORAL at 08:39

## 2023-06-08 RX ADMIN — Medication 25 MICROGRAM(S): at 06:24

## 2023-06-08 RX ADMIN — MIRTAZAPINE 22.5 MILLIGRAM(S): 45 TABLET, ORALLY DISINTEGRATING ORAL at 20:16

## 2023-06-08 RX ADMIN — Medication 150 MILLIGRAM(S): at 08:39

## 2023-06-08 RX ADMIN — ESCITALOPRAM OXALATE 10 MILLIGRAM(S): 10 TABLET, FILM COATED ORAL at 08:40

## 2023-06-08 NOTE — BH INPATIENT PSYCHIATRY PROGRESS NOTE - NSBHFUPINTERVALHXFT_PSY_A_CORE
Patient seen for follow up of Depression  Chart reviewed. Case discussed with interdisciplinary team. No prns given. No acute events overnight    Patient remains depressed but he is feeling better than he did last Friday. Dante says he is feeling hopeful because there have now been six days in a row of feeling better and he is hopeful that the new medications will help him improve even more. The team explained that it will take a while for the drugs to take effect, however being inpatient can have a therapeutic effect on its own. Dante spent yesterday socializing with other patients, talking with loved ones, reading, and praying. He did not go to groups yesterday but expressed interest in going today. Dante expressed interest in when he will be discharged and the team advised that this will be based on when he is feeling better.    The team asked him about his past hesitance to go to therapy more than once a month. He said the did not find meeting with his therapist to be helpful and that it was just talking about how he was doing. When the team asked if he had considered changing his therapist, he was unsure if [facility] would allow him to change. The team advised that if he found someone outside of [facility], they would probably be amenable. The team also counseled that when therapy sessions are spaced far apart, it is much harder for them to be as productive since they become catch-up sessions. The team advised him to consider weekly therapy as it works very well in combination with medicine for depression.     The team asked Dante more about his social supports and family. Beyond his immediate family, he has two sisters and is in touch with his mother. His father  at 16 from liver cancer. Dante described him as a quiet but murphy man who laid down rules in the home and did not tolerate misbehavior. Dante mourned his father but there was also a relaxation of rules in the house after his passing. He said that his mother has coped well with being a  and Dante remains close with her.

## 2023-06-08 NOTE — BH TREATMENT PLAN - NSTXDCOPLKINTERSW_PSY_ALL_CORE
SW reviewed EMR, met with pt for support and gain additional information.l JAZMYNE met with team to assess pt prorgess and tx plan. SW communicated with pts wife for support and psychoeducation.

## 2023-06-08 NOTE — BH INPATIENT PSYCHIATRY PROGRESS NOTE - NSBHASSESSSUMMFT_PSY_ALL_CORE
Dante Hernandez is a 42 y/o male, domiciled with his wife and 2 sons, with a history of treatment for depression since July 2022  with Lexapro, Wellbutrin, Abilify, and Pristiq, a TBI in 2010, and hypothyroidism, presenting with worsening symptoms of depression, loss of hope, and recurrent suicidal ideation. 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    Today, Dante is feeling a little bit better but is still depressed. He will continue increasing his doses of venlafaxine and mirtazapine.      Plan:  1. Legals: 9.13 status  2. Safety: routine observation, denies SI/HI/I/P on the unit. Haldol 5 mg/Ativan 2 mg/Benadryl 50 mg PO q6h/IM PRN for agitation  3. Psychiatric:   	decrease buproprion XL (24-Hour) to 150 mg po qd  	decrease escitalopram to 10 mg po qD, taper down slowly  	mirtazapine to 22.5 mg po qHS  	Increased venlafaxine XR to 150  mg po qD  4. Group, milieu, individual therapy as appropriate.  5. Medical: Hypothyroidism, Levothyroxine 25 mcg, po qD  	Should obtain thyroid level  6. Dispo: pending clinical improvement.  Patient requires inpatient hospitalization for stabilization and safety.

## 2023-06-08 NOTE — BH INPATIENT PSYCHIATRY PROGRESS NOTE - CURRENT MEDICATION
MEDICATIONS  (STANDING):  buPROPion XL (24-Hour) 150 milliGRAM(s) Oral daily  escitalopram 10 milliGRAM(s) Oral daily  levothyroxine 25 MICROGram(s) Oral daily  mirtazapine 22.5 milliGRAM(s) Oral at bedtime  venlafaxine  milliGRAM(s) Oral daily    MEDICATIONS  (PRN):  diphenhydrAMINE 50 milliGRAM(s) Oral every 4 hours PRN agitation  diphenhydrAMINE Injectable 50 milliGRAM(s) IntraMuscular once PRN Agitation  haloperidol     Tablet 5 milliGRAM(s) Oral every 4 hours PRN agitation  haloperidol    Injectable 5 milliGRAM(s) IntraMuscular once PRN Agitation  LORazepam     Tablet 2 milliGRAM(s) Oral every 4 hours PRN agitation  LORazepam   Injectable 2 milliGRAM(s) IntraMuscular once PRN Agitation

## 2023-06-08 NOTE — BH INPATIENT PSYCHIATRY PROGRESS NOTE - MSE UNSTRUCTURED FT
On exam today the patient is anxious but cooperative with good eye contact.    Speech is clear and of normal rate.    Thought process: with no evidence of a disorder of thought process.    Thought content: with no evidence of psychotic beliefs.  Perception: Denies hallucinations.    Mood: Describes as "depressed ".  Affect: constricted.    Patient hopeless but denies active suicidal ideation, intent and plan.    Patient denies active aggressive/homicidal ideation, intent or plan.   Patient is Alert and oriented in all spheres. Patient is cognitively grossly intact.   Insight and judgment are limited. Impulse control is intact at this time

## 2023-06-09 PROCEDURE — 90832 PSYTX W PT 30 MINUTES: CPT | Mod: 59

## 2023-06-09 PROCEDURE — 90853 GROUP PSYCHOTHERAPY: CPT

## 2023-06-09 RX ORDER — MIRTAZAPINE 45 MG/1
30 TABLET, ORALLY DISINTEGRATING ORAL AT BEDTIME
Refills: 0 | Status: DISCONTINUED | OUTPATIENT
Start: 2023-06-10 | End: 2023-06-16

## 2023-06-09 RX ORDER — ESCITALOPRAM OXALATE 10 MG/1
5 TABLET, FILM COATED ORAL DAILY
Refills: 0 | Status: DISCONTINUED | OUTPATIENT
Start: 2023-06-09 | End: 2023-06-14

## 2023-06-09 RX ORDER — MIRTAZAPINE 45 MG/1
22.5 TABLET, ORALLY DISINTEGRATING ORAL AT BEDTIME
Refills: 0 | Status: COMPLETED | OUTPATIENT
Start: 2023-06-09 | End: 2023-06-09

## 2023-06-09 RX ADMIN — MIRTAZAPINE 22.5 MILLIGRAM(S): 45 TABLET, ORALLY DISINTEGRATING ORAL at 20:15

## 2023-06-09 RX ADMIN — BUPROPION HYDROCHLORIDE 150 MILLIGRAM(S): 150 TABLET, EXTENDED RELEASE ORAL at 08:56

## 2023-06-09 RX ADMIN — ESCITALOPRAM OXALATE 10 MILLIGRAM(S): 10 TABLET, FILM COATED ORAL at 08:56

## 2023-06-09 RX ADMIN — Medication 25 MICROGRAM(S): at 06:23

## 2023-06-09 RX ADMIN — Medication 225 MILLIGRAM(S): at 08:56

## 2023-06-09 NOTE — BH PSYCHOLOGY - CLINICIAN PSYCHOTHERAPY NOTE - NSBHPSYCHOLINT_PSY_A_CORE
Dynamic issues addressed/Supported coping skills/Supportive therapy/Treatment compliance encouraged
Cognitive/behavioral therapy/Dynamic issues addressed/Problem-solving techniques discussed/Supportive therapy/Treatment compliance encouraged

## 2023-06-09 NOTE — BH PSYCHOLOGY - CLINICIAN PSYCHOTHERAPY NOTE - NSBHPSYCHOLNARRATIVE_PSY_A_CORE FT
Patient presented restful and more energized, motivated, and cooperative. Hygiene and grooming were good. Mood was less depressed and affect notably brighter. Thought process was linear, more spontaneous, less concrete, and goal-directed. Speech was clear, coherent, and of normal pace and volume; also more spontaneous. No perceptual disturbances were reported or observed. He denied both active and passive suicidal or homicidal ideation, plan or intent. Insight was still limited, judgment was fair.     Patient and writer met for a 20-minute individual therapy session. Patient reported feeling less depressed, more energized, and more hopeful though scared that the medications will continue to work for him. He reports were consistent with writer's observation and assessment. Writer educated patient on the ways his thoughts can impact his perceptions and emotions. Dyad discussed ways in which patient can maintain and sustain his improvements by monitoring his thought process/style of thinking. Patient was receptive to psychoeducation provided.

## 2023-06-09 NOTE — BH INPATIENT PSYCHIATRY PROGRESS NOTE - NSBHFUPINTERVALHXFT_PSY_A_CORE
Patient seen for follow up of Depression  Chart reviewed. Case discussed with interdisciplinary team. No prns given. No acute events overnight    Dante remains depressed but he is continuing to feel better. He spent yesterday talking with family members on the phone and his  came to visit him. He spent a lot of time reading and he attended group sessions during the day. He said it was good to see his . He turned to his  when he began to feel ill and the  encouraged him to seek medical care. He also spoke with his sister who is providing care to his two children while he is away. Dante's wife is involved but is often busy and needs to stay late at work. Dante feels he has strong social supports to help him out. He remains worried that the medication will not work long term, as his past experience has been transient responses to medical therapy. The team provided reassurance that these medications are designed to provide relief for long periods of time.        Dante says he is feeling hopeful because there have now been six days in a row of feeling better and he is hopeful that the new medications will help him improve even more. The team explained that it will take a while for the drugs to take effect, however being inpatient can have a therapeutic effect on its own. Dante spent yesterday socializing with other patients, talking with loved ones, reading, and praying. He did not go to groups yesterday but expressed interest in going today. Dante expressed interest in when he will be discharged and the team advised that this will be based on when he is feeling better. Patient seen for follow up of Depression  Chart reviewed. Case discussed with interdisciplinary team. No prns given. No acute events overnight    Dante remains depressed but he is continuing to feel better. He spent yesterday talking with family members on the phone and his  came to visit him. He spent a lot of time reading and he attended group sessions during the day. He said it was good to see his . He turned to his  when he began to feel ill and the  encouraged him to seek medical care. He also spoke with his sister who is providing care to his two children while he is away. Dante's wife is involved but is often busy and needs to stay late at work. Dante feels he has strong social supports to help him out. He remains worried that the medication will not work long term, as his past experience has been transient responses to medical therapy. The team provided reassurance that these medications are designed to provide relief for long periods of time.      Dante says he is feeling hopeful because there have now been six days in a row of feeling better and he is hopeful that the new medications will help him improve even more. The team explained that it will take a while for the drugs to take effect, however being inpatient can have a therapeutic effect on its own. Dante spent yesterday socializing with other patients, talking with loved ones, reading, and praying. He did not go to groups yesterday but expressed interest in going today. Dante expressed interest in when he will be discharged and the team advised that this will be based on when he is feeling better.

## 2023-06-09 NOTE — BH PSYCHOLOGY - GROUP THERAPY NOTE - NSBHPSYCHOLPARTICIPCOMMENT_PSY_A_CORE FT
Pt consented to join group psychotherapy session with writer and peers. In today's group therapy session, we focused on introducing Dialectical Behavior Therapy (DBT) mindfulness skills, specifically through a guided DBT body scan meditation. The aim of this exercise was to cultivate participants' awareness of their bodily sensations and promote a sense of grounding and present-moment awareness. The session fostered a supportive and non-judgmental environment, encouraging participants to share their experiences and insights. The person goals of the session were to introduce participants to the concept of DBT mindfulness, facilitate a guided DBT body scan meditation to promote present-moment awareness, encourage participants to share their experiences and insights during the meditation,  and foster a sense of connection and support within the group. Pt shared that he was mindful of his thought process during the focused activity, specifically sharing that he constantly thought about his two children and his wife. 
Patient attended Psychology Group. The group discussion focused on Cognitive Behavioral Therapy and the ABC Model of CBT. Patients were provided psychoeducation on the relationship between thoughts, feelings, and behaviors, and how our core belief system influences our automatic thoughts following an activating event. Each patient was given the opportunity to share an experience and example their process through the ABC Model. Patient did not actively participate in the group discussed; however, appeared attentive and engaged throughout the entirety of the group. At the close of group, patient reported the information provided to have been helpful in understanding the power of his thinking.

## 2023-06-09 NOTE — BH INPATIENT PSYCHIATRY PROGRESS NOTE - MSE UNSTRUCTURED FT
On exam today the patient is calm and cooperative with good eye contact.    Speech is clear and of normal rate, rhythm, and volume.   Thought process: with no evidence of a disorder of thought process.    Thought content: with no evidence of psychotic beliefs.  Perception: Denies hallucinations.    Mood: Describes as "depressed ".  Affect: constricted, congruent to mood    Patient denies passive or active suicidal ideation, intent and plan.    Patient denies active aggressive/homicidal ideation, intent or plan.   Patient is Alert and oriented in all spheres. Patient is cognitively grossly intact.   Insight and judgment are limited. Impulse control is intact at this time

## 2023-06-09 NOTE — BH PSYCHOLOGY - CLINICIAN PSYCHOTHERAPY NOTE - TOKEN PULL-DIAGNOSIS
Primary Diagnosis:  MDD (major depressive disorder), severe [F32.2]        Problem Dx:   MDD (major depressive disorder) [F32.9]      
Primary Diagnosis:  MDD (major depressive disorder), severe [F32.2]        Problem Dx:   MDD (major depressive disorder) [F32.9]

## 2023-06-09 NOTE — BH PSYCHOLOGY - CLINICIAN PSYCHOTHERAPY NOTE - NSBHPSYCHOLGOALS_PSY_A_CORE
Assessment
Decrease symptoms/Improve level of independent functioning/Improve social/vocational/coping skills/Psychoeducation/Treatment compliance

## 2023-06-09 NOTE — BH INPATIENT PSYCHIATRY PROGRESS NOTE - NSBHASSESSSUMMFT_PSY_ALL_CORE
Dante Hernandez is a 44 y/o male, domiciled with his wife and 2 sons, with a history of treatment for depression since July 2022  with Lexapro, Wellbutrin, Abilify, and Pristiq, a TBI in 2010, and hypothyroidism, presenting with worsening symptoms of depression, loss of hope, and recurrent suicidal ideation. 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    Today, Dante is feeling a better but is still depressed. He will continue to titrate down his escitalopram and increase his venlafaxine and mirtazapine.      Plan:  1. Legals: 9.13 status  2. Safety: routine observation, denies SI/HI/I/P on the unit. Haldol 5 mg/Ativan 2 mg/Benadryl 50 mg PO q6h/IM PRN for agitation  3. Psychiatric:   	continue on buproprion XL (24-Hour) 150 mg po qd  	decrease escitalopram to 5 mg po qD, taper down slowly  	Continue on mirtazapine 22.5 mg po qHS  	Continue on venlafaxine  mg po qD  4. Group, milieu, individual therapy as appropriate.  5. Medical: Hypothyroidism, Levothyroxine 25 mcg, po qD  	Should obtain thyroid level  6. Dispo: pending clinical improvement.  Patient requires inpatient hospitalization for stabilization and safety.

## 2023-06-09 NOTE — BH PSYCHOLOGY - GROUP THERAPY NOTE - NSPSYCHOLGRPCOGINT_PSY_A_CORE FT
Cognitive Behavioral Therapy, Psychoeducation  [Shortness Of Breath] : shortness of breath [Negative] : Heme/Lymph

## 2023-06-10 RX ADMIN — ESCITALOPRAM OXALATE 5 MILLIGRAM(S): 10 TABLET, FILM COATED ORAL at 08:43

## 2023-06-10 RX ADMIN — Medication 25 MICROGRAM(S): at 06:01

## 2023-06-10 RX ADMIN — BUPROPION HYDROCHLORIDE 150 MILLIGRAM(S): 150 TABLET, EXTENDED RELEASE ORAL at 08:43

## 2023-06-10 RX ADMIN — MIRTAZAPINE 30 MILLIGRAM(S): 45 TABLET, ORALLY DISINTEGRATING ORAL at 21:07

## 2023-06-10 RX ADMIN — Medication 225 MILLIGRAM(S): at 08:43

## 2023-06-11 RX ADMIN — BUPROPION HYDROCHLORIDE 150 MILLIGRAM(S): 150 TABLET, EXTENDED RELEASE ORAL at 08:23

## 2023-06-11 RX ADMIN — Medication 225 MILLIGRAM(S): at 08:22

## 2023-06-11 RX ADMIN — Medication 25 MICROGRAM(S): at 06:06

## 2023-06-11 RX ADMIN — ESCITALOPRAM OXALATE 5 MILLIGRAM(S): 10 TABLET, FILM COATED ORAL at 08:23

## 2023-06-11 RX ADMIN — MIRTAZAPINE 30 MILLIGRAM(S): 45 TABLET, ORALLY DISINTEGRATING ORAL at 20:25

## 2023-06-12 RX ADMIN — MIRTAZAPINE 30 MILLIGRAM(S): 45 TABLET, ORALLY DISINTEGRATING ORAL at 20:24

## 2023-06-12 RX ADMIN — ESCITALOPRAM OXALATE 5 MILLIGRAM(S): 10 TABLET, FILM COATED ORAL at 09:00

## 2023-06-12 RX ADMIN — Medication 225 MILLIGRAM(S): at 09:00

## 2023-06-12 RX ADMIN — BUPROPION HYDROCHLORIDE 150 MILLIGRAM(S): 150 TABLET, EXTENDED RELEASE ORAL at 09:00

## 2023-06-12 RX ADMIN — Medication 25 MICROGRAM(S): at 07:10

## 2023-06-12 NOTE — BH INPATIENT PSYCHIATRY PROGRESS NOTE - NSBHFUPINTERVALHXFT_PSY_A_CORE
Patient seen for follow up of Depression  Chart reviewed. Case discussed with interdisciplinary team. No prns given. No acute events overnight    Dante is continuing to feel better. Over the weekend his wife, sister, and some friends all visited and he reiterated his gratitude for strong social support. He passed the time this weekend by talking to more people and continuing to read. He is interested in being discharged this week and feels that the medication is working although he continues to fear that it will stop working. He recently received a letter discharging him from [facility] which he was confused by but is not upset because he would like to change psychiatrists. He is not interested in a more intensive outpatient program and feels that therapy would not be that useful for him in the future.

## 2023-06-12 NOTE — BH INPATIENT PSYCHIATRY PROGRESS NOTE - NSBHASSESSSUMMFT_PSY_ALL_CORE
Dante Hernandez is a 44 y/o male, domiciled with his wife and 2 sons, with a history of treatment for depression since July 2022  with Lexapro, Wellbutrin, Abilify, and Pristiq, a TBI in 2010, and hypothyroidism, presenting with worsening symptoms of depression, loss of hope, and recurrent suicidal ideation. 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    Today, Dante is feeling considerably better. He has reached target doses on venlafaxine and mirtazapine and will taper off of lexapro and buproprion. He hopes to be discharged soon and wants to find a new outpatient psychiatrist.       Plan:  1. Legals: 9.13 status  2. Safety: routine observation, denies SI/HI/I/P on the unit. Haldol 5 mg/Ativan 2 mg/Benadryl 50 mg PO q6h/IM PRN for agitation  3. Psychiatric:   	Discontinue buproprion XL (24-Hour) 150 mg po qd  	Discontinue escitalopram 5 mg po qD  	Continue on mirtazapine 22.5 mg po qHS  	Continue on venlafaxine  mg po qD  4. Group, milieu, individual therapy as appropriate.  5. Medical: Hypothyroidism, Levothyroxine 25 mcg, po qD  	Should obtain thyroid level  6. Dispo: pending clinical improvement.  Patient requires inpatient hospitalization for stabilization and safety. Dante Hernandez is a 42 y/o male, domiciled with his wife and 2 sons, with a history of treatment for depression since July 2022  with Lexapro, Wellbutrin, Abilify, and Pristiq, a TBI in 2010, and hypothyroidism, presenting with worsening symptoms of depression, loss of hope, and recurrent suicidal ideation. 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    Today, Dante is feeling considerably better. He has reached target doses on venlafaxine and mirtazapine and will taper off of lexapro and buproprion. He hopes to be discharged soon and wants to find a new outpatient psychiatrist.       Plan:  1. Legals: 9.13 status  2. Safety: routine observation, denies SI/HI/I/P on the unit. Haldol 5 mg/Ativan 2 mg/Benadryl 50 mg PO q6h/IM PRN for agitation  3. Psychiatric:   	Discontinue buproprion XL (24-Hour) 150 mg po qd after tomorrow  	Discontinue escitalopram 5 mg po qD after Wednesday  	Continue on mirtazapine 30 mg po qHS  	Continue on venlafaxine  mg po qD  4. Group, milieu, individual therapy as appropriate.  5. Medical: Hypothyroidism, Levothyroxine 25 mcg, po qD  	Should obtain thyroid level  6. Dispo: pending clinical improvement.  Patient requires inpatient hospitalization for stabilization and safety.

## 2023-06-12 NOTE — BH INPATIENT PSYCHIATRY PROGRESS NOTE - CURRENT MEDICATION
MEDICATIONS  (STANDING):  buPROPion XL (24-Hour) 150 milliGRAM(s) Oral daily  escitalopram 5 milliGRAM(s) Oral daily  levothyroxine 25 MICROGram(s) Oral daily  mirtazapine 30 milliGRAM(s) Oral at bedtime  venlafaxine  milliGRAM(s) Oral daily    MEDICATIONS  (PRN):  diphenhydrAMINE 50 milliGRAM(s) Oral every 4 hours PRN agitation  diphenhydrAMINE Injectable 50 milliGRAM(s) IntraMuscular once PRN Agitation  haloperidol     Tablet 5 milliGRAM(s) Oral every 4 hours PRN agitation  haloperidol    Injectable 5 milliGRAM(s) IntraMuscular once PRN Agitation  LORazepam     Tablet 2 milliGRAM(s) Oral every 4 hours PRN agitation  LORazepam   Injectable 2 milliGRAM(s) IntraMuscular once PRN Agitation

## 2023-06-12 NOTE — BH INPATIENT PSYCHIATRY PROGRESS NOTE - MSE UNSTRUCTURED FT
On exam today the patient is well groomed and has good hygiene.  Patient is calm and cooperative with good eye contact.    Speech is clear and of normal rate, rhythm, and volume.   Thought process: circumstantial    Thought content: with no evidence of psychotic beliefs.  Perception: Denies hallucinations.    Mood: "feeling okay"    Affect: less constricted then on previous interviews, congruent to mood    Patient denies passive or active suicidal ideation, intent and plan.    Patient denies active aggressive/homicidal ideation, intent or plan.   Patient is Alert and oriented in all spheres. Patient is cognitively grossly intact.   Insight and judgment are limited. Impulse control is intact at this time

## 2023-06-13 RX ADMIN — Medication 25 MICROGRAM(S): at 06:04

## 2023-06-13 RX ADMIN — Medication 225 MILLIGRAM(S): at 09:19

## 2023-06-13 RX ADMIN — MIRTAZAPINE 30 MILLIGRAM(S): 45 TABLET, ORALLY DISINTEGRATING ORAL at 20:08

## 2023-06-13 RX ADMIN — ESCITALOPRAM OXALATE 5 MILLIGRAM(S): 10 TABLET, FILM COATED ORAL at 09:20

## 2023-06-13 RX ADMIN — BUPROPION HYDROCHLORIDE 150 MILLIGRAM(S): 150 TABLET, EXTENDED RELEASE ORAL at 09:20

## 2023-06-13 NOTE — BH INPATIENT PSYCHIATRY PROGRESS NOTE - MSE UNSTRUCTURED FT
On exam today the patient is well groomed and has good hygiene.  Patient is calm and cooperative with good eye contact.    Speech is clear and of normal rate, rhythm, and volume.   Thought process: linear and goal directed  Thought content: with no evidence of psychotic beliefs.  Perception: Denies hallucinations.    Mood: "feeling okay"    Affect: less constricted then on previous interviews, congruent to mood    Patient denies passive or active suicidal ideation, intent and plan.    Patient denies active aggressive/homicidal ideation, intent or plan.   Patient is Alert and oriented in all spheres. Patient is cognitively grossly intact.   Insight and judgment are limited. Impulse control is intact at this time

## 2023-06-13 NOTE — BH INPATIENT PSYCHIATRY PROGRESS NOTE - NSBHFUPINTERVALHXFT_PSY_A_CORE
Patient seen for follow up of Depression  Chart reviewed. Case discussed with interdisciplinary team. No prns given. No acute events overnight    Yesterday Dante was feeling very anxious; he described feeling intense unease. He did not know what prompted it but he was disappointed because he felt he had been on an exclusively upward trajectory. He was upset because a friend came to visit and he was so unsettled that he felt a need to lie down and he could not spend a lot of time with the friend. Despite this anxiety, he slept well and today the feelings of unease are lessened but still present. He is not feeling sad and has no passive or active SI. Dante is feeling bored and misses his family; he would like to be discharged soon. The team advised that feelings of anxiety are normal with depression and that there can be ups and downs in treatment, he is doing well and the team will plan for discharge this week.

## 2023-06-14 RX ADMIN — Medication 225 MILLIGRAM(S): at 08:12

## 2023-06-14 RX ADMIN — MIRTAZAPINE 30 MILLIGRAM(S): 45 TABLET, ORALLY DISINTEGRATING ORAL at 20:15

## 2023-06-14 RX ADMIN — BUPROPION HYDROCHLORIDE 150 MILLIGRAM(S): 150 TABLET, EXTENDED RELEASE ORAL at 08:12

## 2023-06-14 RX ADMIN — Medication 25 MICROGRAM(S): at 06:11

## 2023-06-14 RX ADMIN — ESCITALOPRAM OXALATE 5 MILLIGRAM(S): 10 TABLET, FILM COATED ORAL at 08:12

## 2023-06-14 NOTE — BH INPATIENT PSYCHIATRY PROGRESS NOTE - MSE UNSTRUCTURED FT
On exam today the patient is well groomed and has good hygiene.  Patient is calm and cooperative with good eye contact.    Speech is clear and of normal rate, rhythm, and volume.   Thought process: linear and goal directed  Thought content: with no evidence of psychotic beliefs.  Perception: Denies hallucinations.    Mood: "feeling okay"    Affect: less constricted then on previous interviews, congruent to mood    Patient denies passive or active suicidal ideation, intent and plan.    Patient denies active aggressive/homicidal ideation, intent or plan.   Patient is Alert and oriented in all spheres. Patient is cognitively grossly intact.   Insight and judgment are limited. Impulse control is intact at this time       On exam today the patient is well groomed and has good hygiene.  Patient is calm and cooperative with good eye contact.    Speech is clear and of normal rate, rhythm, and volume.   Thought process: linear and goal directed  Thought content: with no evidence of psychotic beliefs.  Perception: Denies hallucinations.    Mood: "feeling okay"    Affect: depressed, incongruent to mood    Patient denies passive or active suicidal ideation, intent and plan.    Patient denies active aggressive/homicidal ideation, intent or plan.   Patient is Alert and oriented in all spheres. Patient is cognitively grossly intact.   Insight and judgment are limited. Impulse control is intact at this time

## 2023-06-14 NOTE — BH INPATIENT PSYCHIATRY PROGRESS NOTE - NSBHASSESSSUMMFT_PSY_ALL_CORE
Dante Hernandez is a 44 y/o male, domiciled with his wife and 2 sons, with a history of treatment for depression since July 2022  with Lexapro, Wellbutrin, Abilify, and Pristiq, a TBI in 2010, and hypothyroidism, presenting with worsening symptoms of depression, loss of hope, and recurrent suicidal ideation. 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    Today, Dante is feeling okay but is feeling anxious. He has reached target doses on venlafaxine and mirtazapine and will soon taper off of lexapro and buproprion. He hopes to be discharged soon.       Plan:  1. Legals: 9.13 status  2. Safety: routine observation, denies SI/HI/I/P on the unit. Haldol 5 mg/Ativan 2 mg/Benadryl 50 mg PO q6h/IM PRN for agitation  3. Psychiatric:   	Discontinue buproprion XL (24-Hour) 150 mg po qd  	Discontinue escitalopram 5 mg po qD after tomorrow  	Continue on mirtazapine 30 mg po qHS  	Continue on venlafaxine  mg po qD  4. Group, milieu, individual therapy as appropriate.  5. Medical: Hypothyroidism, Levothyroxine 25 mcg, po qD  	Should obtain thyroid level  6. Dispo: pending clinical improvement.  Patient requires inpatient hospitalization for stabilization and safety. Dante Hernandez is a 42 y/o male, domiciled with his wife and 2 sons, with a history of treatment for depression since July 2022  with Lexapro, Wellbutrin, Abilify, and Pristiq, a TBI in 2010, and hypothyroidism, presenting with worsening symptoms of depression, loss of hope, and recurrent suicidal ideation. 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    Today, Dante is feeling okay but upset he has not been discharged. He has reached target doses on venlafaxine and mirtazapine and will discontinue lexapro and buproprion.      Plan:  1. Legals: 9.13 status  2. Safety: routine observation, denies SI/HI/I/P on the unit. Haldol 5 mg/Ativan 2 mg/Benadryl 50 mg PO q6h/IM PRN for agitation  3. Psychiatric:   	Discontinue buproprion XL (24-Hour) 150 mg po qd  	Discontinue escitalopram 5 mg po qD   	Continue on mirtazapine 30 mg po qHS  	Continue on venlafaxine  mg po qD  4. Group, milieu, individual therapy as appropriate.  5. Medical: Hypothyroidism, Levothyroxine 25 mcg, po qD  	Should obtain thyroid level  6. Dispo: pending clinical improvement.  Patient requires inpatient hospitalization for stabilization and safety. Dante Hernandez is a 42 y/o male, domiciled with his wife and 2 sons, with a history of treatment for depression since July 2022  with Lexapro, Wellbutrin, Abilify, and Pristiq, a TBI in 2010, and hypothyroidism, presenting with worsening symptoms of depression, loss of hope, and recurrent suicidal ideation. 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    Today, Dante is feeling okay but upset he has not been discharged. He has reached target doses on venlafaxine and mirtazapine and will discontinue lexapro and buproprion.  Showing improvements in anxiety and energy.      Plan:  1. Legals: 9.13 status  2. Safety: routine observation, denies SI/HI/I/P on the unit. Haldol 5 mg/Ativan 2 mg/Benadryl 50 mg PO q6h/IM PRN for agitation  3. Psychiatric:   	Discontinue buproprion XL (24-Hour) 150 mg po qd  	Discontinue escitalopram 5 mg po qD   	Continue on mirtazapine 30 mg po qHS  	Continue on venlafaxine  mg po qD  4. Group, milieu, individual therapy as appropriate.  5. Medical: Hypothyroidism, Levothyroxine 25 mcg, po qD  	Should obtain thyroid level  6. Dispo: pending clinical improvement.  Patient requires inpatient hospitalization for stabilization and safety.

## 2023-06-14 NOTE — BH INPATIENT PSYCHIATRY PROGRESS NOTE - NSBHFUPINTERVALHXFT_PSY_A_CORE
Patient seen for follow up of Depression  Chart reviewed. Case discussed with interdisciplinary team. No prns given. No acute events overnight    Dante is upset today that he is not being discharged today. He said that yesterday was a better day, he is not feeling anxious anymore. He described his anxiety as a 3/10 and said that his energy was good. He spent time yesterday going to groups and his wife came to visit. Dante says he is doing better and is focused on discharge.  Patient seen for follow up of Depression  Chart reviewed. Case discussed with interdisciplinary team. No prns given. No acute events overnight    Dante is upset today that he is not being discharged today. He said that yesterday was a better day than before, he is not feeling anxious anymore. He described his anxiety as a 3/10 and said that his energy was good. He spent time yesterday going to groups and his wife came to visit. Dante says he is doing better and is focused on discharge. He asked the team when we will discontinue his lexapro and Wellbutrin and the team said we will discuss.  Patient seen for follow up of Depression  Chart reviewed. Case discussed with interdisciplinary team. No prns given. No acute events overnight    Dante is upset that he is not being discharged today. He said yesterday was a better day than before, he is not feeling anxious anymore. He described his anxiety as a 3/10 and said that his energy was good. He spent time yesterday going to groups and his wife came to visit. Dante says he is doing better and is focused on discharge. He asked the team when we will discontinue his lexapro and Wellbutrin, and the team said we will discuss.  Patient seen for follow up of Depression  Chart reviewed. Case discussed with interdisciplinary team. No prns given. No acute events overnight    Dante is upset that he is not being discharged today. He said yesterday was a better day than before, he is not feeling as anxious anymore. He described his anxiety as a 3/10 and said that his energy was good. He spent time yesterday going to groups and his wife came to visit. Dante says he is doing better and is focused on discharge. He asked the team when we will discontinue his lexapro and Wellbutrin, and the team said we will discuss.

## 2023-06-14 NOTE — BH INPATIENT PSYCHIATRY PROGRESS NOTE - CURRENT MEDICATION
MEDICATIONS  (STANDING):  buPROPion XL (24-Hour) 150 milliGRAM(s) Oral daily  escitalopram 5 milliGRAM(s) Oral daily  levothyroxine 25 MICROGram(s) Oral daily  mirtazapine 30 milliGRAM(s) Oral at bedtime  venlafaxine  milliGRAM(s) Oral daily    MEDICATIONS  (PRN):  diphenhydrAMINE 50 milliGRAM(s) Oral every 4 hours PRN agitation  diphenhydrAMINE Injectable 50 milliGRAM(s) IntraMuscular once PRN Agitation  haloperidol     Tablet 5 milliGRAM(s) Oral every 4 hours PRN agitation  haloperidol    Injectable 5 milliGRAM(s) IntraMuscular once PRN Agitation   MEDICATIONS  (STANDING):  levothyroxine 25 MICROGram(s) Oral daily  mirtazapine 30 milliGRAM(s) Oral at bedtime  venlafaxine  milliGRAM(s) Oral daily    MEDICATIONS  (PRN):  diphenhydrAMINE 50 milliGRAM(s) Oral every 4 hours PRN agitation  diphenhydrAMINE Injectable 50 milliGRAM(s) IntraMuscular once PRN Agitation  haloperidol     Tablet 5 milliGRAM(s) Oral every 4 hours PRN agitation  haloperidol    Injectable 5 milliGRAM(s) IntraMuscular once PRN Agitation

## 2023-06-15 PROBLEM — E03.9 HYPOTHYROIDISM, UNSPECIFIED: Chronic | Status: ACTIVE | Noted: 2023-06-02

## 2023-06-15 RX ORDER — MIRTAZAPINE 45 MG/1
1 TABLET, ORALLY DISINTEGRATING ORAL
Qty: 30 | Refills: 0
Start: 2023-06-15 | End: 2023-07-14

## 2023-06-15 RX ORDER — LEVOTHYROXINE SODIUM 125 MCG
1 TABLET ORAL
Qty: 0 | Refills: 0 | DISCHARGE
Start: 2023-06-15

## 2023-06-15 RX ORDER — VENLAFAXINE HCL 75 MG
1 CAPSULE, EXT RELEASE 24 HR ORAL
Qty: 30 | Refills: 0
Start: 2023-06-15 | End: 2023-07-14

## 2023-06-15 RX ADMIN — Medication 225 MILLIGRAM(S): at 08:11

## 2023-06-15 RX ADMIN — Medication 25 MICROGRAM(S): at 06:10

## 2023-06-15 RX ADMIN — MIRTAZAPINE 30 MILLIGRAM(S): 45 TABLET, ORALLY DISINTEGRATING ORAL at 20:34

## 2023-06-15 NOTE — BH DISCHARGE NOTE NURSING/SOCIAL WORK/PSYCH REHAB - NSCDUDCCRISIS_PSY_A_CORE
formerly [facility] Well  1 [phone]) formerly [facility]-WELL ([phone])  Text "WELL" to [phone]  Website: www.[employer]/.[facility] 1 (611) 621-HOPE (8471) Website: www.safehorizon.org/.National Suicide Prevention Lifeline 7 (829) 828-6677/.  [facility]  1 (551) LIFENET (991-9158)/.  [facility]  [phone] [phone]/.  [facility]  [phone] [place], [place] [address] [phone]   Hours:  Monday through Friday from 9 AM to 3 PM/988 Suicide and Crisis Lifeline

## 2023-06-15 NOTE — BH DISCHARGE NOTE NURSING/SOCIAL WORK/PSYCH REHAB - DISCHARGE INSTRUCTIONS AFTERCARE APPOINTMENTS
In order to check the location, date, or time of your aftercare appointment, please refer to your Discharge Instructions Document given to you upon leaving the hospital.  If you have lost the instructions please call [phone]

## 2023-06-15 NOTE — BH INPATIENT PSYCHIATRY DISCHARGE NOTE - ATTENDING DISCHARGE PHYSICAL EXAMINATION:
On exam today the patient is well groomed and has good hygiene.  Patient is calm and cooperative with good eye contact.    Speech is clear and of normal rate, rhythm, and volume.   Thought process: linear and goal directed  Thought content: with no evidence of psychotic beliefs.  Perception: Denies hallucinations.    Mood: "feeling good"    Affect: less constricted than on admission, congruent to mood    Patient denies passive or active suicidal ideation, intent and plan.    Patient denies active aggressive/homicidal ideation, intent or plan.   Patient is Alert and oriented in all spheres. Patient is cognitively grossly intact.   Insight and judgment are limited. Impulse control is intact at this time

## 2023-06-15 NOTE — BH INPATIENT PSYCHIATRY DISCHARGE NOTE - DESCRIPTION
Mr. Hernandez is , with 2 boys aged 3 and 6. 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 He states he has no environmental stressors at home and that he has a lot of support at home from his wife. In addition to his immediate family, he is touch with his mother and two sisters. He has a social network of friends and a  at his [employer].     He consumes alcohol recreationally and says he has no history of nicotine, marijuana, or other substance use. He states he does not have a gun at home.

## 2023-06-15 NOTE — BH INPATIENT PSYCHIATRY PROGRESS NOTE - NSTXCOPEDATEEST_PSY_ALL_CORE
02-Jun-2023
13-Jun-2023
02-Jun-2023
13-Jun-2023

## 2023-06-15 NOTE — BH DISCHARGE NOTE NURSING/SOCIAL WORK/PSYCH REHAB - PATIENT PORTAL LINK FT
You can access the FollowMyHealth Patient Portal offered by [facility] by registering at the following website: [email or web address]. By joining [facility]’s FollowMyHealth portal, you will also be able to view your health information using other applications (apps) compatible with our system.

## 2023-06-15 NOTE — BH INPATIENT PSYCHIATRY DISCHARGE NOTE - HPI (INCLUDE ILLNESS QUALITY, SEVERITY, DURATION, TIMING, CONTEXT, MODIFYING FACTORS, ASSOCIATED SIGNS AND SYMPTOMS)
Dante Hernandez is a 44 y/o man, domiciled with his wife and two sons, with a history of depression since July 2022 treated with Lexapro, Wellbutrin, Abilify, and Prestiq, a TBI in 2010, and hypothyroidism, admitted on a voluntary status for progressive worsening of his depression and passive suicidal ideation. Mr. Hernandez first sought medical attention for his depression in July of 2022 when he suffered from depressed mood and a panic attack. He said he was unaware of any precipitating factors that would have caused this decline in mood. He went to the [facility] ED where he met with a psychiatrist and he was discharged with Lexapro 10 mg. He met with the [facility] Crisis Team where he was connected to [facility] with telepsychiatry appointments. After a few weeks he was told he needed to go in person, so he sought a psychiatrist closer to home through [facility]. Over the past 10 months he has seen a few clincians through [facility], most recently CHACHO Bauer. Over the past year his Lexapro was increased to 20mg and he has also been on Abilfy 5 mg and Wellbutrin 400mg. He was briefly on Pristiq with an unknown dose. After medications are changed or doses are modified, he has experienced transient relief from medications but no durable remission of symptoms. Recently his psychiatric RN believed that his symptoms were due to insufficient sleep and he underwent a sleep study at [facility], results pending. In the past week he describes a worsening of depressive symptoms including thoughts of not wanting to live, leading him to come to [facility] and voluntarily admit himself.     Mr. Hernandez says that he feels depressed and he is unable to get shiraz from activities he used to enjoy. He has no confidence in himself, and he has been avoiding spending time with friends due to his symptoms. He does not get excited by things anymore. He fears that nothing will allow him to feel better and says he is losing hope.

## 2023-06-15 NOTE — BH INPATIENT PSYCHIATRY PROGRESS NOTE - NSTXDCOPLKDATEEST_PSY_ALL_CORE
02-Jun-2023
12-Jun-2023
02-Jun-2023
02-Jun-2023
12-Jun-2023
02-Jun-2023
12-Jun-2023
02-Jun-2023

## 2023-06-15 NOTE — BH INPATIENT PSYCHIATRY DISCHARGE NOTE - HOSPITAL COURSE
Dante Hernandez is a 42 y/o man, domiciled with his wife and two sons, with a history of depression since July 2022 treated with Lexapro, Wellbutrin, Abilify, and Prestiq, a TBI in 2010, and hypothyroidism, admitted on a voluntary status for progressive worsening of his depression and passive suicidal ideation. In the week prior to admission, he described a worsening of depressive symptoms including thoughts of not wanting to live, leading him to come to the [facility] ED and voluntarily admit himself. He feared nothing would allow him to feel better and he said he was losing hope.   The treatment team recommended transitioning his medications to a combination of Venlafaxine and Mirtazapine, supplemented by ECT, while tapering off Escitalopram and Buproprion. Dante was very interested in changing his medical therapy and he initially agreed to begin ECT the Monday after he was admitted. When that day came, Dante felt he could not go forward with ECT and he was very anxious about it. H worried if it would be a treatment that would work long-term. The team agreed that he would not have ECT that day and would instead start on Wednesday. The next day, Mr. Hernandez continued to perseverate over starting with ECT. He insisted he was feeling better already and so he did not need to begin ECT and he worried about how effective it would be. The team decided to continue treatment with medicine and psychotherapy without ECT.    Throughout his first week of hospitalization, Dante was doing well. He said he was feeling much better and rarely attended groups. He did not feel he could learn from the groups. In sessions with psychiatry and psychology, he lacked insight into triggers for his depression, despite sharing his stress and overwork at his job and other environmental stressors in his life. He was not interested in discharge yet and wanted to continue feeling better.     In the beginning of his second week, Dante said that he was feeling much better and desperately wanted to be discharged. He did not want to return to his previous outpatient team at [facility] so the team searched for appropriate outpatient care. He had episodes of anxiety throughout the week which provoked distress and he was unsure what caused them. Despite this, he reported that his mood symptoms continued to improve. Dante was discharged on Friday 6/16/23, two weeks after his arrival at [facility].     Suicide and risk assessment performed prior to discharge. The patient has a low acute risk and low chronic risk of self-harm and aggression towards others. Protective factors include denying SI, no SIB, denying HI, good social supports in their family, no substance abuse, no current mood symptoms, no hopelessness, future-oriented in returning to home, no access to firearms.  Risk factors include presenting illness. Immediate risk was minimized by inpatient admission to a safe environment with appropriate supervision and limited access to lethal means. Future risk was minimized before discharge by treatment of acute episode, maximizing outpatient support, providing relevant patient education, discussing emergency procedures, and ensuring close follow-up. The patient remains at a low risk of self-harm, and such risk cannot be further ameliorated by continued inpatient treatment and the patient is therefore appropriate for discharge.       There were no behavioral problems on the unit.  Patient did not become agitated and did not require emergent intramuscular medications or seclusion / restraints.  Patient did not self-harm on the unit.  Patient remained actively engaged in treatment.  Patient participated in individual, group, and milieu therapy.  Patient got along appropriately with staff and peers.     Patient did not have any medical problems during this hospitalization.  There were no medical consultations.    A full discussion of the factors that predict treatment success and relapse was held including safety planning.  A discussion of the risks and benefits of patient’s medication.    The patient has improved significantly and no longer requires inpatient treatment and care. Patient denies all suicidal and aggressive ideation, intent and plan. Patient denies anxiety symptoms and panic attacks. Patient is not judged to be an acute danger to self or others at this time. Patient will be discharged today to home and outpatient follow up.     Dante Hernandez was admitted on a voluntary status for progressive worsening of his depression and passive suicidal ideation. In the week prior to admission, he described a worsening of depressive symptoms including thoughts of not wanting to live, leading him to come to the [facility] ED and voluntarily admit himself. He feared nothing would allow him to feel better and he said he was losing hope.   The treatment team recommended transitioning his medications to a combination of Venlafaxine and Mirtazapine, supplemented by ECT, while tapering off Escitalopram and Buproprion. Dante was very interested in changing his medical therapy and he initially agreed to begin ECT the Monday after he was admitted. When that day came, Dante felt he could not go forward with ECT and he was very anxious about it. H worried if it would be a treatment that would work long-term. The team agreed that he would not have ECT that day and would instead start on Wednesday. The next day, Mr. Hernandez continued to perseverate over starting with ECT. He insisted he was feeling better already and so he did not need to begin ECT and he worried about how effective it would be. The team decided to continue treatment with medicine and psychotherapy without ECT.    Throughout his first week of hospitalization, Dante was doing well. He said he was feeling much better and rarely attended groups. He did not feel he could learn from the groups. In sessions with psychiatry and psychology, he lacked insight into triggers for his depression, despite sharing his stress and overwork at his job and other environmental stressors in his life. He was not interested in discharge yet and wanted to continue feeling better.     In the beginning of his second week, Dante said that he was feeling much better and desperately wanted to be discharged. He did not want to return to his previous outpatient team at [facility] so the team searched for appropriate outpatient care. He had episodes of anxiety throughout the week which provoked distress and he was unsure what caused them. Despite this, he reported that his mood symptoms continued to improve. Dante was discharged on Friday 6/16/23, two weeks after his arrival at [facility].     Suicide and risk assessment performed prior to discharge. The patient has a low acute risk and low chronic risk of self-harm and aggression towards others. Protective factors include denying SI, no SIB, denying HI, good social supports in their family, no substance abuse, no current mood symptoms, no hopelessness, future-oriented in returning to home, no access to firearms.  Risk factors include presenting illness. Immediate risk was minimized by inpatient admission to a safe environment with appropriate supervision and limited access to lethal means. Future risk was minimized before discharge by treatment of acute episode, maximizing outpatient support, providing relevant patient education, discussing emergency procedures, and ensuring close follow-up. The patient remains at a low risk of self-harm, and such risk cannot be further ameliorated by continued inpatient treatment and the patient is therefore appropriate for discharge.       There were no behavioral problems on the unit.  Patient did not become agitated and did not require emergent intramuscular medications or seclusion / restraints.  Patient did not self-harm on the unit.  Patient remained actively engaged in treatment.  Patient participated in individual, group, and milieu therapy.  Patient got along appropriately with staff and peers.     Patient did not have any medical problems during this hospitalization.  There were no medical consultations.    A full discussion of the factors that predict treatment success and relapse was held including safety planning.  A discussion of the risks and benefits of patient’s medication.    The patient has improved significantly and no longer requires inpatient treatment and care. Patient denies all suicidal and aggressive ideation, intent and plan. Patient denies anxiety symptoms and panic attacks. Patient is not judged to be an acute danger to self or others at this time. Patient will be discharged today to home and outpatient follow up.     Dante Hernandez was admitted on a voluntary status for progressive worsening of his depression and passive suicidal ideation. In the week prior to admission, he described a worsening of depressive symptoms including thoughts of not wanting to live, leading him to come to the [facility] ED and voluntarily admit himself. He feared nothing would allow him to feel better and he said he was losing hope.   The treatment team recommended transitioning his medications to a combination of Venlafaxine and Mirtazapine, supplemented by ECT, while tapering off Escitalopram and Buproprion. Dante was very interested in changing his medical therapy and he initially agreed to begin ECT the Monday after he was admitted. When that day came, Dante felt he could not go forward with ECT and he was very anxious about it. H worried if it would be a treatment that would work long-term. The team agreed that he would not have ECT that day and would instead start on Wednesday. The next day, Mr. Hernandez continued to perseverate over starting with ECT. He insisted he was feeling better already and so he did not need to begin ECT and he worried about how effective it would be. The team decided to continue treatment with medicine and psychotherapy without ECT.    Throughout his first week of hospitalization, Dante was doing well. He said he was feeling much better and rarely attended groups. He did not feel he could learn from the groups. In sessions with psychiatry and psychology, he lacked insight into triggers for his depression, despite sharing his stress and overwork at his job and other environmental stressors in his life. He was not interested in discharge yet and wanted to continue feeling better.     In the beginning of his second week, Dante said that he was feeling much better and wanted to be discharged. He did not want to return to his previous outpatient team at [facility] so the team searched for appropriate outpatient care. He had episodes of anxiety throughout the week which provoked distress and he was unsure what caused them. Despite this, he reported that his mood symptoms continued to improve. Dante was discharged on Friday 6/16/23, two weeks after his arrival at [facility].     Suicide and risk assessment performed prior to discharge. The patient has a low acute risk and low chronic risk of self-harm and aggression towards others. Protective factors include denying SI, no SIB, denying HI, good social supports in their family, no substance abuse, no current mood symptoms, no hopelessness, future-oriented in returning to home, no access to firearms.  Risk factors include presenting illness. Immediate risk was minimized by inpatient admission to a safe environment with appropriate supervision and limited access to lethal means. Future risk was minimized before discharge by treatment of acute episode, maximizing outpatient support, providing relevant patient education, discussing emergency procedures, and ensuring close follow-up. The patient remains at a low risk of self-harm, and such risk cannot be further ameliorated by continued inpatient treatment and the patient is therefore appropriate for discharge.       There were no behavioral problems on the unit.  Patient did not become agitated and did not require emergent intramuscular medications or seclusion / restraints.  Patient did not self-harm on the unit.  Patient remained actively engaged in treatment.  Patient participated in individual, group, and milieu therapy.  Patient got along appropriately with staff and peers.     Patient did not have any medical problems during this hospitalization.  There were no medical consultations.    A full discussion of the factors that predict treatment success and relapse was held including safety planning.  A discussion of the risks and benefits of patient’s medication.    The patient has improved significantly and no longer requires inpatient treatment and care. Patient denies all suicidal and aggressive ideation, intent and plan. Patient denies anxiety symptoms and panic attacks. Patient is not judged to be an acute danger to self or others at this time. Patient will be discharged today to home and outpatient follow up.

## 2023-06-15 NOTE — BH INPATIENT PSYCHIATRY DISCHARGE NOTE - OTHER PAST PSYCHIATRIC HISTORY (INCLUDE DETAILS REGARDING ONSET, COURSE OF ILLNESS, INPATIENT/OUTPATIENT TREATMENT)
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     Patient states that he has been feeling depressed and started on lexapro in July 2022.  States that he has been having worsening depression and anxiety over the past few days.  Denies any acute stressors.  States that lately he has just been feeling more down, unmotivated, and unable to function at work endorsing that he has used up all his sick days. He states that he has been getting worsening passive suicidal thoughts and is worried that he may act on it.  He continues to endorse that he has been feeling distressed and restless.  Expressed frustration with his treatment.  States that he was seeing an NP who started him on lexapro and then wellbutrin and then added olanzepine for sleep which made him more agitated.  States that he was briefly on Abilify from 5mg to 10mg and became restless and was eventually discontinued.  States that he felt momentarily brief effectiveness of treatment.  Recently he sought a second opinion with [facility] who suggested trial with Zoloft.  Reports feeling hopeless and wanting to come to the hospital for help before things get worse    Patient reports depressive symptoms including depressed mood, anhedonia, poor energy/concentration/decreased appetite, with frequent sleep disturbances and middle insomnia. Patient reports symptoms of anxiety including anxious mood, symptoms of panic disorder. He denies social anxiety or social stressors.  Patient denies any psychotic symptoms including paranoia or auditory/visual hallucinations. Patient denies active suicidal/homicidal ideations, intent or plans. States that he just wants to get help.     Per collateral information from wife by the team: "reports worsening anxiety and depression over the past 2 weeks.  States that he was crying today and has been uninhibited by his emotions but states that he was overtly suicidal/homicidal ideations, intent or plan and stated that he wanted to go to the hospital.  Reports that he has a lot of downs.  Reports going to another provider today and wanted a second opinion and feeling hopeless with prognosis and treatment options.  Denies acute stressors."    During interview on the unit: Patient confirmed the above. He feels that he was having fatigue and exhaustion for about 5 years. In July 2022, he had suicidal ideation out of blue and saw an image of himself hanging. It was there for few seconds and then he got anxious with perspiration and went to emergency room. Since then, he has seen different psychiatric providers for depression and anxiety. He had some relief in his symptoms for few weeks to less than 2 months, but not complete remission of symptoms and then gradually started to have worsening of symptoms. He currently rates his depression as 9/10 with 10 being the worst depression. It is worse in the morning and is associated with middle and terminal insomnia. He used to love playing Guitar, listening to music and playing basketball. He no longer enjoys those activities and has severe anhedonia and amotivation. He has crying episodes everyday. Daily chores seem too much of a task to him. He has difficulty in concentration. Appetite is low. He denies death wish or suicidal ideation or homicidal ideation/intent/plan. He however experiences recurrent intrusive images of other people killing themselves with different methods. He finds these images stressful and identifies them as egodystonic. Denies any other OCD symptoms. He denies kate/hypomania or psychosis symptoms.  Depression is associated with severe anxiety and occasional panic attacks.     Past Psychiatric History: no previous h/o psychiatric hospitalization or suicide attempt. No prior psychaitric history apart from above.     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 No access to Gun. Drinks Etoh socially. No tobacco use or recreational drug use.

## 2023-06-15 NOTE — BH DISCHARGE NOTE NURSING/SOCIAL WORK/PSYCH REHAB - NSDCBELONGINGSDISPO_PSY_ALL_CORE
Returned to patient Dapsone Pregnancy And Lactation Text: This medication is Pregnancy Category C and is not considered safe during pregnancy or breast feeding.

## 2023-06-15 NOTE — BH INPATIENT PSYCHIATRY PROGRESS NOTE - CURRENT MEDICATION
MEDICATIONS  (STANDING):  levothyroxine 25 MICROGram(s) Oral daily  mirtazapine 30 milliGRAM(s) Oral at bedtime  venlafaxine  milliGRAM(s) Oral daily    MEDICATIONS  (PRN):  diphenhydrAMINE 50 milliGRAM(s) Oral every 4 hours PRN agitation  diphenhydrAMINE Injectable 50 milliGRAM(s) IntraMuscular once PRN Agitation  haloperidol     Tablet 5 milliGRAM(s) Oral every 4 hours PRN agitation  haloperidol    Injectable 5 milliGRAM(s) IntraMuscular once PRN Agitation

## 2023-06-15 NOTE — BH INPATIENT PSYCHIATRY DISCHARGE NOTE - NSBHMETABOLIC_PSY_ALL_CORE_FT
BMI: BMI (kg/m2): 23.4 (06-02-23 @ 06:53)  HbA1c:   Glucose:   BP: 123/95 (06-15-23 @ 08:30) (123/95 - 146/99)  Lipid Panel:

## 2023-06-15 NOTE — BH INPATIENT PSYCHIATRY PROGRESS NOTE - NSTXDCOPLKINTERMD_PSY_ALL_CORE
return to outpatient NP

## 2023-06-15 NOTE — BH INPATIENT PSYCHIATRY DISCHARGE NOTE - NSBHASSESSSUMMFT_PSY_ALL_CORE
Dante Hernandez is a 44 y/o male, domiciled with his wife and 2 sons, with a history of treatment for depression since July 2022  with Lexapro, Wellbutrin, Abilify, and Pristiq, a TBI in 2010, and hypothyroidism, presenting with worsening symptoms of depression, loss of hope, and recurrent suicidal ideation. 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    Today, Dante is feeling good. He is excited to return home and he is being discharged back to his home with his wife and children.       Plan:  1. Legals: 9.13 status  2. Safety: routine observation, denies SI/HI/I/P on the unit. Haldol 5 mg/Ativan 2 mg/Benadryl 50 mg PO q6h/IM PRN for agitation  3. Psychiatric:   	Continue on mirtazapine 30 mg po qHS  	Continue on venlafaxine  mg po qD  4. Group, milieu, individual therapy as appropriate.  5. Medical: Hypothyroidism, Levothyroxine 25 mcg, po qD  	Should obtain thyroid level  6. Dispo: Discharge today, will receive follow up care with [facility] and a crisis center bridge appointment.

## 2023-06-15 NOTE — BH INPATIENT PSYCHIATRY PROGRESS NOTE - MSE UNSTRUCTURED FT
On exam today the patient is well groomed and has good hygiene.  Patient is calm and cooperative with good eye contact.    Speech is clear and of normal rate, rhythm, and volume.   Thought process: linear and goal directed  Thought content: with no evidence of psychotic beliefs.  Perception: Denies hallucinations.    Mood: "feeling okay"    Affect: depressed, incongruent to mood    Patient denies passive or active suicidal ideation, intent and plan.    Patient denies active aggressive/homicidal ideation, intent or plan.   Patient is Alert and oriented in all spheres. Patient is cognitively grossly intact.   Insight and judgment are limited. Impulse control is intact at this time

## 2023-06-15 NOTE — BH INPATIENT PSYCHIATRY PROGRESS NOTE - NSBHMETABOLIC_PSY_ALL_CORE_FT
BMI: BMI (kg/m2): 23.4 (06-02-23 @ 06:53)  HbA1c:   Glucose:   BP: 128/94 (06-05-23 @ 07:52) (110/93 - 128/94)  Lipid Panel: 
BMI: BMI (kg/m2): 23.4 (06-02-23 @ 06:53)  HbA1c:   Glucose:   BP: 121/86 (06-12-23 @ 07:52) (121/86 - 131/88)  Lipid Panel: 
BMI: BMI (kg/m2): 23.4 (06-02-23 @ 06:53)  HbA1c:   Glucose:   BP: 131/92 (06-13-23 @ 08:12) (121/86 - 131/92)  Lipid Panel: 
BMI: BMI (kg/m2): 23.4 (06-02-23 @ 06:53)  HbA1c:   Glucose:   BP: 110/93 (06-04-23 @ 09:30) (110/93 - 140/95)  Lipid Panel: 
BMI: BMI (kg/m2): 23.4 (06-02-23 @ 06:53)  HbA1c:   Glucose:   BP: 128/94 (06-05-23 @ 07:52) (110/93 - 128/94)  Lipid Panel: 
BMI: BMI (kg/m2): 23.4 (06-02-23 @ 06:53)  HbA1c:   Glucose:   BP: 129/87 (06-09-23 @ 06:38) (129/87 - 136/99)  Lipid Panel: 
BMI: BMI (kg/m2): 23.4 (06-02-23 @ 06:53)  HbA1c:   Glucose:   BP: 125/90 (06-14-23 @ 07:38) (121/86 - 131/92)  Lipid Panel: 
BMI: BMI (kg/m2): 23.4 (06-02-23 @ 06:53)  HbA1c:   Glucose:   BP: 113/73 (06-01-23 @ 23:58) (113/73 - 140/95)  Lipid Panel: 
BMI: BMI (kg/m2): 23.4 (06-02-23 @ 06:53)  HbA1c:   Glucose:   BP: 136/99 (06-07-23 @ 07:53) (128/94 - 136/99)  Lipid Panel: 
BMI: BMI (kg/m2): 23.4 (06-02-23 @ 06:53)  HbA1c:   Glucose:   BP: 123/95 (06-15-23 @ 08:30) (123/95 - 146/99)  Lipid Panel: 
BMI: BMI (kg/m2): 23.4 (06-02-23 @ 06:53)  HbA1c:   Glucose:   BP: 136/99 (06-07-23 @ 07:53) (136/99 - 136/99)  Lipid Panel:

## 2023-06-15 NOTE — BH DISCHARGE NOTE NURSING/SOCIAL WORK/PSYCH REHAB - NSDCPRGOAL_PSY_ALL_CORE
Pt has demonstrated much progress towards psychiatric rehabilitation goals during the current hospitalization. Pt is able to identify deep breathing, exercise, and talking with family as healthy coping strategies to better manage symptoms. Pt endorses improvements in mood, sleep, appetite, and hopefulness for the future. Pt reports some anxiety regarding discharge, however, is better able to tolerate anxiety with coping strategies. Pt also denies any current SI/HI, intent, or plan. Pt engaged in safety planning, which can be reviewed in the  Safety Plan document. Pt was able to identify various warning signs, coping skills, and social supports to utilize after discharge. Pt has been compliant with medications and is tolerating them well. During the current hospitalization, pt was increasingly receptive to skill development. Pt attended approximately 40% of daily psychiatric rehabilitation groups. Pt was quiet in groups and participated appropriately with encouragement. Pt did not require any redirection and has not been a behavioral management issue on the unit. Pt was visible in the milieu and demonstrated some positive socialization with select peers. Pt is able to verbalize thoughts, needs, and feelings with encouragement. Pt demonstrates improving insight and judgement into symptoms and treatment. Pt was provided with a Press Ganey survey to complete prior to discharge.

## 2023-06-15 NOTE — BH INPATIENT PSYCHIATRY PROGRESS NOTE - NSBHCHARTREVIEWVS_PSY_A_CORE FT
Vital Signs Last 24 Hrs  T(C): 36.2 (06-08-23 @ 06:35), Max: 36.2 (06-08-23 @ 06:35)  T(F): 97.1 (06-08-23 @ 06:35), Max: 97.1 (06-08-23 @ 06:35)  HR: --  BP: --  BP(mean): --  RR: --  SpO2: --    Orthostatic VS  06-08-23 @ 06:35  Lying BP: --/-- HR: --  Sitting BP: 129/83 HR: 102  Standing BP: 121/65 HR: 76  Site: --  Mode: --  
Vital Signs Last 24 Hrs  T(C): 36.2 (06-13-23 @ 08:12), Max: 36.2 (06-13-23 @ 08:12)  T(F): 97.2 (06-13-23 @ 08:12), Max: 97.2 (06-13-23 @ 08:12)  HR: 99 (06-13-23 @ 08:12) (99 - 99)  BP: 131/92 (06-13-23 @ 08:12) (131/92 - 131/92)  BP(mean): --  RR: --  SpO2: --    
Vital Signs Last 24 Hrs  T(C): 36.3 (06-06-23 @ 08:14), Max: 36.3 (06-06-23 @ 08:14)  T(F): 97.3 (06-06-23 @ 08:14), Max: 97.3 (06-06-23 @ 08:14)  HR: --  BP: --  BP(mean): --  RR: --  SpO2: --    Orthostatic VS  06-06-23 @ 08:14  Lying BP: --/-- HR: --  Sitting BP: 126/95 HR: 104  Standing BP: 116/97 HR: 117  Site: --  Mode: --  Orthostatic VS  06-04-23 @ 19:08  Lying BP: --/-- HR: --  Sitting BP: 133/89 HR: 82  Standing BP: 124/86 HR: 109  Site: --  Mode: --  
Vital Signs Last 24 Hrs  T(C): 35.7 (06-07-23 @ 07:53), Max: 35.7 (06-07-23 @ 07:53)  T(F): 96.2 (06-07-23 @ 07:53), Max: 96.2 (06-07-23 @ 07:53)  HR: 88 (06-07-23 @ 07:53) (88 - 88)  BP: 136/99 (06-07-23 @ 07:53) (136/99 - 136/99)  BP(mean): --  RR: --  SpO2: --    Orthostatic VS  06-06-23 @ 08:14  Lying BP: --/-- HR: --  Sitting BP: 126/95 HR: 104  Standing BP: 116/97 HR: 117  Site: --  Mode: --  
Vital Signs Last 24 Hrs  T(C): 36.4 (06-15-23 @ 08:13), Max: 36.4 (06-15-23 @ 08:13)  T(F): 97.5 (06-15-23 @ 08:13), Max: 97.5 (06-15-23 @ 08:13)  HR: 106 (06-15-23 @ 08:30) (90 - 106)  BP: 123/95 (06-15-23 @ 08:30) (123/95 - 146/99)  BP(mean): --  RR: --  SpO2: --    
Vital Signs Last 24 Hrs  T(C): 36.4 (06-14-23 @ 07:38), Max: 36.4 (06-14-23 @ 07:38)  T(F): 97.6 (06-14-23 @ 07:38), Max: 97.6 (06-14-23 @ 07:38)  HR: 100 (06-14-23 @ 07:38) (100 - 100)  BP: 125/90 (06-14-23 @ 07:38) (125/90 - 125/90)  BP(mean): --  RR: --  SpO2: --    
Vital Signs Last 24 Hrs  T(C): 36.4 (06-09-23 @ 06:38), Max: 36.4 (06-09-23 @ 06:38)  T(F): 97.5 (06-09-23 @ 06:38), Max: 97.5 (06-09-23 @ 06:38)  HR: 92 (06-09-23 @ 06:38) (92 - 92)  BP: 129/87 (06-09-23 @ 06:38) (129/87 - 129/87)  BP(mean): --  RR: --  SpO2: --    Orthostatic VS  06-08-23 @ 06:35  Lying BP: --/-- HR: --  Sitting BP: 129/83 HR: 102  Standing BP: 121/65 HR: 76  Site: --  Mode: --  
Vital Signs Last 24 Hrs  T(C): 36.6 (06-05-23 @ 07:52), Max: 36.7 (06-04-23 @ 19:08)  T(F): 97.9 (06-05-23 @ 07:52), Max: 98.1 (06-04-23 @ 19:08)  HR: 107 (06-05-23 @ 07:52) (107 - 107)  BP: 128/94 (06-05-23 @ 07:52) (128/94 - 128/94)  BP(mean): --  RR: --  SpO2: --    Orthostatic VS  06-04-23 @ 19:08  Lying BP: --/-- HR: --  Sitting BP: 133/89 HR: 82  Standing BP: 124/86 HR: 109  Site: --  Mode: --  Orthostatic VS  06-03-23 @ 19:12  Lying BP: --/-- HR: --  Sitting BP: 121/60 HR: 79  Standing BP: 123/65 HR: 82  Site: --  Mode: --  
Vital Signs Last 24 Hrs  T(C): 36.2 (06-04-23 @ 09:30), Max: 36.2 (06-03-23 @ 19:12)  T(F): 97.2 (06-04-23 @ 09:30), Max: 97.2 (06-04-23 @ 09:30)  HR: 110 (06-04-23 @ 09:30) (110 - 110)  BP: 110/93 (06-04-23 @ 09:30) (110/93 - 110/93)  BP(mean): --  RR: --  SpO2: --    Orthostatic VS  06-03-23 @ 19:12  Lying BP: --/-- HR: --  Sitting BP: 121/60 HR: 79  Standing BP: 123/65 HR: 82  Site: --  Mode: --  Orthostatic VS  06-03-23 @ 07:49  Lying BP: --/-- HR: --  Sitting BP: 118/87 HR: 93  Standing BP: 117/89 HR: 108  Site: upper left arm  Mode: electronic  
Vital Signs Last 24 Hrs  T(C): 36.8 (06-03-23 @ 07:49), Max: 36.8 (06-03-23 @ 07:49)  T(F): 98.3 (06-03-23 @ 07:49), Max: 98.3 (06-03-23 @ 07:49)  HR: --  BP: --  BP(mean): --  RR: --  SpO2: --    Orthostatic VS  06-03-23 @ 07:49  Lying BP: --/-- HR: --  Sitting BP: 118/87 HR: 93  Standing BP: 117/89 HR: 108  Site: upper left arm  Mode: electronic  Orthostatic VS  06-02-23 @ 06:53  Lying BP: --/-- HR: --  Sitting BP: 112/80 HR: 77  Standing BP: 123/97 HR: 84  Site: --  Mode: --  
Vital Signs Last 24 Hrs  T(C): 36.7 (06-12-23 @ 07:52), Max: 36.7 (06-12-23 @ 07:52)  T(F): 98 (06-12-23 @ 07:52), Max: 98 (06-12-23 @ 07:52)  HR: 97 (06-12-23 @ 07:52) (97 - 97)  BP: 121/86 (06-12-23 @ 07:52) (121/86 - 121/86)  BP(mean): --  RR: --  SpO2: --

## 2023-06-15 NOTE — BH INPATIENT PSYCHIATRY PROGRESS NOTE - NSTXDEPRESDATETRGT_PSY_ALL_CORE
09-Jun-2023
16-Jun-2023
09-Jun-2023
20-Jun-2023
14-Jun-2023
16-Jun-2023
14-Jun-2023
20-Jun-2023
16-Jun-2023

## 2023-06-15 NOTE — BH INPATIENT PSYCHIATRY PROGRESS NOTE - NSBHATTESTTYPEVISIT_PSY_A_CORE
Attending Only
Attending with Resident/Fellow/Student
Attending with Resident/Fellow/Student
Attending Only
Attending with Resident/Fellow/Student

## 2023-06-15 NOTE — BH INPATIENT PSYCHIATRY PROGRESS NOTE - NSTXDEPRESPROGRES_PSY_ALL_CORE
No Change
No Change
Improving
No Change
Improving
No Change
Improving
Improving

## 2023-06-15 NOTE — BH INPATIENT PSYCHIATRY DISCHARGE NOTE - NSBHFUPINTERVALHXFT_PSY_A_CORE
Patient seen for follow up of Depression  Chart reviewed. Case discussed with interdisciplinary team. No prns given. No acute events overnight    Dante is excited to be discharged but has a little bit of anxiety surrounding discharge. He worries about how his mood and energy will be once he has left the hospital. The team reassured him that his medications should continue to be effective for him. Extensive psychoeducation was given on his condition as well as a discussion of future treatment options if his symptoms are to recur. In addition to medication, the team discussed ways to maintain a healthy lifestyle such as eating well and exercising. Dante was grateful for his care here and looks forward to continuing to improve while at home.   Discharge Progress Note Date and Time: 06-16-23 @ 19:14  Patient seen for follow up of Depression  Chart reviewed. Case discussed with interdisciplinary team. No prns given. No acute events overnight    Dante is excited to be discharged but has a little bit of anxiety surrounding discharge. He worries about how his mood and energy will be once he has left the hospital. The team reassured him that his medications should continue to be effective for him. Extensive psychoeducation was given on his condition as well as a discussion of future treatment options if his symptoms are to recur. In addition to medication, the team discussed ways to maintain a healthy lifestyle such as eating well and exercising. Dante was grateful for his care here and looks forward to continuing to improve while at home.

## 2023-06-15 NOTE — BH INPATIENT PSYCHIATRY PROGRESS NOTE - NSTXDEPRESDATEEST_PSY_ALL_CORE
02-Jun-2023
01-Jun-2023
13-Jun-2023
02-Jun-2023
13-Jun-2023
02-Jun-2023
01-Jun-2023
02-Jun-2023
02-Jun-2023

## 2023-06-15 NOTE — BH INPATIENT PSYCHIATRY PROGRESS NOTE - NSTXDCOPLKPROGRES_PSY_ALL_CORE
No Change
Improving
No Change
Improving
Improving

## 2023-06-15 NOTE — BH INPATIENT PSYCHIATRY DISCHARGE NOTE - NSDCCPCAREPLAN_GEN_ALL_CORE_FT
PRINCIPAL DISCHARGE DIAGNOSIS  Diagnosis: MDD (major depressive disorder), severe  Assessment and Plan of Treatment:

## 2023-06-15 NOTE — BH INPATIENT PSYCHIATRY PROGRESS NOTE - NSBHFUPINTERVALHXFT_PSY_A_CORE
Patient seen for follow up of Depression  Chart reviewed. Case discussed with interdisciplinary team. No prns given. No acute events overnight    Dante says he is doing well. He slept well overnight and described yesterday as good but long. He is feeling bored on the unit. He said he had a period of anxiety lasting 30 min to 1 hour yesterday but is feeling better today. He does no know what caused this. He is additionally worried because of his slightly elevated BP. The team advised that this is likely to be transient, as his BP has been relatively stable with only occasional fluctuations while has been here. If his BP remains elevated, we recommend that he discuss it with his PCP. He commented on discontinuing Lexapro and Wellbutrin and he is worried about withdrawal. The team counseled him that given the slow taper as well as the transition to Effexor, this is unlikely but we will monitor him. He remains excited for discharge.  Patient seen for follow up of Depression  Chart reviewed. Case discussed with interdisciplinary team. No prns given. No acute events overnight    Dante says he is doing well. He slept well overnight and described yesterday as good but long. He is feeling bored on the unit. He said he had a period of anxiety lasting 30 min to 1 hour yesterday but is feeling better today. He does not know what caused this. He is additionally worried because of his slightly elevated BP. The team advised that this is likely to be transient, as his BP has been relatively stable with only occasional fluctuations while has been here. If his BP remains elevated, we recommend that he discuss it with his PCP. He commented on discontinuing Lexapro and Wellbutrin and he is worried about withdrawal. The team counseled him that given the slow taper as well as the transition to Effexor, this is unlikely but we will monitor him. He remains excited for discharge.

## 2023-06-15 NOTE — BH INPATIENT PSYCHIATRY DISCHARGE NOTE - NSDCMRMEDTOKEN_GEN_ALL_CORE_FT
Ativan 1 mg oral tablet: 1 tab(s) orally 3 times a day MDD:Please use only as needed  escitalopram 10 mg oral tablet: 1 tab(s) orally once a day    levothyroxine 25 mcg (0.025 mg) oral tablet: 1 tab(s) orally once a day  mirtazapine 30 mg oral tablet: 1 tab(s) orally once a day (at bedtime)  venlafaxine 225 mg oral tablet, extended release: 1 tab(s) orally once a day

## 2023-06-15 NOTE — BH INPATIENT PSYCHIATRY PROGRESS NOTE - NSTXDEPRESINTERMD_PSY_ALL_CORE
change to effexor/remeron and therapy

## 2023-06-15 NOTE — BH INPATIENT PSYCHIATRY PROGRESS NOTE - NSBHATTESTBILLING_PSY_A_CORE
41002-Bppzhwdyxq OBS or IP - moderate complexity OR 35-49 mins
72208-Zpltideypp OBS or IP - low complexity OR 25-34 mins
69552-Sfkcybiloe OBS or IP - moderate complexity OR 35-49 mins
16060-Rxhyccinki OBS or IP - low complexity OR 25-34 mins
78570-Jaarprmryp OBS or IP - low complexity OR 25-34 mins
35001-Dvoodvxqni OBS or IP - low complexity OR 25-34 mins
59231-Wsebgehosw OBS or IP - low complexity OR 25-34 mins
76951-Zbpigacqfi OBS or IP - low complexity OR 25-34 mins
34040-Egwsgoyprg OBS or IP - low complexity OR 25-34 mins
27801-Uoybglsiyc OBS or IP - low complexity OR 25-34 mins
07236-Xpbrlzqreh OBS or IP - low complexity OR 25-34 mins

## 2023-06-15 NOTE — BH INPATIENT PSYCHIATRY PROGRESS NOTE - NSTXDCOPLKDATETRGT_PSY_ALL_CORE
09-Jun-2023
19-Jun-2023
09-Jun-2023
19-Jun-2023
09-Jun-2023
19-Jun-2023

## 2023-06-15 NOTE — BH INPATIENT PSYCHIATRY PROGRESS NOTE - NSBHASSESSSUMMFT_PSY_ALL_CORE
Dante Hernandez is a 44 y/o male, domiciled with his wife and 2 sons, with a history of treatment for depression since July 2022  with Lexapro, Wellbutrin, Abilify, and Pristiq, a TBI in 2010, and hypothyroidism, presenting with worsening symptoms of depression, loss of hope, and recurrent suicidal ideation. 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    Today, Dante is feeling okay, he remains bored and discharge focused. He has reached target doses on venlafaxine and mirtazapine and has discontinued lexapro and buproprion.  Showing improvements in anxiety and energy.      Plan:  1. Legals: 9.13 status  2. Safety: routine observation, denies SI/HI/I/P on the unit. Haldol 5 mg/Ativan 2 mg/Benadryl 50 mg PO q6h/IM PRN for agitation  3. Psychiatric:   	Continue on mirtazapine 30 mg po qHS  	Continue on venlafaxine  mg po qD  4. Group, milieu, individual therapy as appropriate.  5. Medical: Hypothyroidism, Levothyroxine 25 mcg, po qD  	Should obtain thyroid level  6. Dispo: pending clinical improvement.  Patient requires inpatient hospitalization for stabilization and safety.

## 2023-06-15 NOTE — BH INPATIENT PSYCHIATRY PROGRESS NOTE - PRN MEDS
MEDICATIONS  (PRN):  diphenhydrAMINE 50 milliGRAM(s) Oral every 4 hours PRN agitation  diphenhydrAMINE Injectable 50 milliGRAM(s) IntraMuscular once PRN Agitation  haloperidol     Tablet 5 milliGRAM(s) Oral every 4 hours PRN agitation  haloperidol    Injectable 5 milliGRAM(s) IntraMuscular once PRN Agitation  LORazepam     Tablet 2 milliGRAM(s) Oral every 4 hours PRN agitation  LORazepam   Injectable 2 milliGRAM(s) IntraMuscular once PRN Agitation  
MEDICATIONS  (PRN):  diphenhydrAMINE 50 milliGRAM(s) Oral every 4 hours PRN agitation  diphenhydrAMINE Injectable 50 milliGRAM(s) IntraMuscular once PRN Agitation  haloperidol     Tablet 5 milliGRAM(s) Oral every 4 hours PRN agitation  haloperidol    Injectable 5 milliGRAM(s) IntraMuscular once PRN Agitation  
MEDICATIONS  (PRN):  diphenhydrAMINE 50 milliGRAM(s) Oral every 4 hours PRN agitation  diphenhydrAMINE Injectable 50 milliGRAM(s) IntraMuscular once PRN Agitation  haloperidol     Tablet 5 milliGRAM(s) Oral every 4 hours PRN agitation  haloperidol    Injectable 5 milliGRAM(s) IntraMuscular once PRN Agitation  LORazepam     Tablet 2 milliGRAM(s) Oral every 4 hours PRN agitation  LORazepam   Injectable 2 milliGRAM(s) IntraMuscular once PRN Agitation  
MEDICATIONS  (PRN):  diphenhydrAMINE 50 milliGRAM(s) Oral every 4 hours PRN agitation  diphenhydrAMINE Injectable 50 milliGRAM(s) IntraMuscular once PRN Agitation  haloperidol     Tablet 5 milliGRAM(s) Oral every 4 hours PRN agitation  haloperidol    Injectable 5 milliGRAM(s) IntraMuscular once PRN Agitation

## 2023-06-15 NOTE — BH INPATIENT PSYCHIATRY PROGRESS NOTE - NSBHATTESTCOMMENTATTENDFT_PSY_A_CORE
Agree with above.  Patient concerned about dBP being elevated near 100mmHg on measurement this morning.  Explained Effexor at higher doses can increase BP but not generally isolated dBP.  No significant trend in change in BP over his admission and has high 90s dBP on admission before any med changes.  Will continue to monitor.  Patient feeling better, consistent with flight into health.  
Agree with above.  Patient is ambivalent about ECT and ultimately decides to not have ECT and focus on medication management.  He slept better and is tolerating switch to Effexor-Remeron.
Agree with above.  Patient asks pointed questions about ECT including specifics about risks of long vs short term memory loss as well as risk of relapse with and without maintenance, probability of remission with vs without undergoing ECT.  Decides not to have ECT today and will reconsider Wednesday start vs simply changing medication regimen.  Will continue to titrate Effexor and Remeron.  
Agree with above.  BP today WNL.  Patient feels relieved at this.  Continues with likely flight into health.  Tolerating meds well.  Continue Effexor and Remeron titration.  
Agree with above.  Patient has improved in depressive symptoms.  Plan for discharge tomorrow. 
Agree with above.  Depression continues to be improved.  Continue Remeron titration.  Effexor at goal.  
Agree with above.  Patient continues to improve in terms of depressive sx.  Will taper his prior medications with Effexor and Remeron now at goal doses.  
Agree with above.  Patient with some return of anxiety but it was transient.  Questions if this is an effect of med titration.  Requests discharge which is being planned for the end of the week. 
The patient continues to show improvement in symptoms, with improving energy and less anxiety.  Able to attend groups, which he finds helpful.  Tolerating medications well.  Will discontinue Lexapro and Wellbutrin.  Disposition planning.

## 2023-06-16 VITALS — SYSTOLIC BLOOD PRESSURE: 121 MMHG | HEART RATE: 101 BPM | DIASTOLIC BLOOD PRESSURE: 91 MMHG | TEMPERATURE: 98 F

## 2023-06-16 RX ADMIN — Medication 225 MILLIGRAM(S): at 08:01

## 2023-06-16 RX ADMIN — Medication 25 MICROGRAM(S): at 05:45

## 2023-06-21 ENCOUNTER — OUTPATIENT (OUTPATIENT)
Dept: OUTPATIENT SERVICES | Facility: HOSPITAL | Age: 43
LOS: 1 days | Discharge: HOME | End: 2023-06-21
Payer: COMMERCIAL

## 2023-06-21 LAB
T4 FREE SERPL-MCNC: 1.4 NG/DL — SIGNIFICANT CHANGE UP (ref 0.9–1.8)
TSH SERPL-MCNC: 4.35 UIU/ML — HIGH (ref 0.27–4.2)

## 2023-06-21 PROCEDURE — 99214 OFFICE O/P EST MOD 30 MIN: CPT | Mod: 95

## 2023-06-21 PROCEDURE — 90833 PSYTX W PT W E/M 30 MIN: CPT | Mod: 95

## 2023-06-22 DIAGNOSIS — F33.2 MAJOR DEPRESSIVE DISORDER, RECURRENT SEVERE WITHOUT PSYCHOTIC FEATURES: ICD-10-CM

## 2023-06-26 PROCEDURE — 99214 OFFICE O/P EST MOD 30 MIN: CPT | Mod: 95

## 2023-06-26 PROCEDURE — 90833 PSYTX W PT W E/M 30 MIN: CPT | Mod: 95

## 2023-07-18 PROCEDURE — 90833 PSYTX W PT W E/M 30 MIN: CPT | Mod: 95

## 2023-07-18 PROCEDURE — 99214 OFFICE O/P EST MOD 30 MIN: CPT | Mod: 95

## 2023-11-06 NOTE — BH INPATIENT PSYCHIATRY PROGRESS NOTE - NSTXCOPEDATETRGT_PSY_ALL_CORE
08-Jun-2023
20-Jun-2023
20-Jun-2023
08-Jun-2023
Protopic Counseling: Patient may experience a mild burning sensation during topical application. Protopic is not approved in children less than 2 years of age. There have been case reports of hematologic and skin malignancies in patients using topical calcineurin inhibitors although causality is questionable.

## 2023-12-07 NOTE — ED ADULT NURSE NOTE - CHIEF COMPLAINT
Called patient to schedule a mammogram. Patient declined mammogram and will discuss with provider at next visit.  12/07/23 PG   The patient is a 42y Male complaining of suicidal thoughts.

## 2024-08-15 NOTE — BH INPATIENT PSYCHIATRY DISCHARGE NOTE - ATTENDING DISCHARGE PHYSICAL EXAM:
[Initial Consultation] : an initial consultation for [FreeTextEntry3] : allergies Attending Discharge Physical Examination:

## 2025-02-10 NOTE — ED BEHAVIORAL HEALTH ASSESSMENT NOTE - SUBSTANCE USE
Received a call from Fracisco requesting a CBC and CMP when he has labs on 2/14/2025. OK Per Fracisco.   
None known

## 2025-04-15 NOTE — ED ADULT TRIAGE NOTE - PAIN RATING/NUMBER SCALE (0-10): REST
Rx Refill Note  Requested Prescriptions     Pending Prescriptions Disp Refills    Eliquis 5 MG tablet tablet [Pharmacy Med Name: Eliquis 5 MG Oral Tablet] 180 tablet 3     Sig: TAKE 1 TABLET BY MOUTH TWICE  DAILY      Last office visit with prescribing clinician: 3/10/2025   Last telemedicine visit with prescribing clinician: Visit date not found   Next office visit with prescribing clinician: 9/15/2025                         Would you like a call back once the refill request has been completed: [] Yes [] No    If the office needs to give you a call back, can they leave a voicemail: [] Yes [] No    Nicole Og MA  04/15/25, 07:57 EDT    
0